# Patient Record
Sex: MALE | Race: WHITE | NOT HISPANIC OR LATINO | URBAN - METROPOLITAN AREA
[De-identification: names, ages, dates, MRNs, and addresses within clinical notes are randomized per-mention and may not be internally consistent; named-entity substitution may affect disease eponyms.]

---
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
## 2021-01-05 PROCEDURE — 99214 OFFICE O/P EST MOD 30 MIN: CPT | Mod: 95

## 2021-02-22 PROCEDURE — 99442: CPT

## 2021-03-22 PROCEDURE — 99442: CPT

## 2021-05-10 PROCEDURE — 99214 OFFICE O/P EST MOD 30 MIN: CPT | Mod: 95

## 2021-06-07 PROCEDURE — 99442: CPT

## 2021-07-12 PROCEDURE — 99442: CPT

## 2021-08-02 PROCEDURE — 99214 OFFICE O/P EST MOD 30 MIN: CPT | Mod: 95

## 2021-09-13 PROCEDURE — 99214 OFFICE O/P EST MOD 30 MIN: CPT | Mod: 95

## 2021-10-18 PROCEDURE — 99442: CPT

## 2021-11-01 PROCEDURE — 99442: CPT

## 2021-11-29 PROCEDURE — 99214 OFFICE O/P EST MOD 30 MIN: CPT | Mod: 95

## 2021-11-30 NOTE — ED PROVIDER NOTE - PRINCIPAL DIAGNOSIS
Closed head injury, initial encounter Social: Denies any alcohol, tobacco, illicit substance use  Psych: denies any anxiety or depression

## 2021-12-27 PROCEDURE — 99442: CPT

## 2022-01-24 PROCEDURE — 99214 OFFICE O/P EST MOD 30 MIN: CPT | Mod: 95

## 2022-02-26 NOTE — ED BEHAVIORAL HEALTH ASSESSMENT NOTE - LEGAL HISTORY
Chief complaint:   Chief Complaint   Patient presents with   • URI     rm 5   • Office Visit       Vitals:  Visit Vitals  Pulse 130   Temp 100.1 °F (37.8 °C) (Tympanic)   Resp 32   Ht 3' 0.05\" (0.916 m)   Wt 15 kg (33 lb 1.1 oz)   SpO2 97%   BMI 17.89 kg/m²       HISTORY OF PRESENT ILLNESS     HPI    4-year-old girl brought in by dad along with her brother with complain of fever, cough and congestion x 0 days.    Dad states patient has runny nose, nasal congestion cough.  She has had intermittent fever for last 2-3 days temp here is 100.1 F.  Dad states appetite is slightly less than usual.  Patient is drinking plenty of fluids including water, juice and milk.  She seems little tired but is getting better today.  Dad denies any difficulty breathing, wheezing, vomiting, diarrhea, pain, ear pain drainage and rash.  Patient does not go to .  She stays home with her mom. Dad states he has no concern for COVID-19 infection.  Mom had COVID-19 infection 2022.    Other significant problems:  Patient Active Problem List    Diagnosis Date Noted   • Single liveborn, born in hospital, delivered by  delivery 2017     Priority: Low   • Oscar positive 2018     Priority: Low       PAST MEDICAL, FAMILY AND SOCIAL HISTORY     Medications:  No current outpatient medications on file.     No current facility-administered medications for this visit.       Allergies:  ALLERGIES:  No Known Allergies    Past Medical  History/Surgeries:  No past medical history on file.    No past surgical history on file.    Family History:  No family history on file.    Social History:  Social History     Tobacco Use   • Smoking status: Not on file   • Smokeless tobacco: Not on file   Substance Use Topics   • Alcohol use: Not on file       REVIEW OF SYSTEMS     Review of Systems   Constitutional: Positive for appetite change and fever. Negative for irritability.   HENT: Positive for congestion and rhinorrhea.     Respiratory: Positive for cough. Negative for wheezing.    Gastrointestinal: Negative for diarrhea and vomiting.   Skin: Negative for rash.       PHYSICAL EXAM     Physical Exam  Vitals and nursing note reviewed.   Constitutional:       General: She is active.      Appearance: She is well-developed. She is not toxic-appearing.   HENT:      Right Ear: Tympanic membrane, ear canal and external ear normal. Tympanic membrane is not erythematous or bulging.      Left Ear: Tympanic membrane and external ear normal. Tympanic membrane is not erythematous or bulging.      Nose: Congestion present. No rhinorrhea.      Mouth/Throat:      Pharynx: Oropharynx is clear.      Comments: No  erythema of posterior pharyngeal  wall noted. No tonsillar enlargement, exudate or abscess noted.  Uvula midline.     Neck: Neck supple.   Eyes:      Conjunctiva/sclera: Conjunctivae normal.      Pupils: Pupils are equal, round, and reactive to light.   Cardiovascular:      Rate and Rhythm: Normal rate and regular rhythm.      Pulses: Normal pulses.      Heart sounds: Normal heart sounds, S1 normal and S2 normal.   Pulmonary:      Effort: Pulmonary effort is normal.      Breath sounds: Normal breath sounds.      Comments: Lungs - No use of accessory muscles of respiration. Good and equal air entry in both lung fields. No wheezing, rales, stridor or rhonchi noted. Pulse ox normal.    Skin:     Capillary Refill: Capillary refill takes less than 2 seconds.   Neurological:      Mental Status: She is alert.         ASSESSMENT/PLAN     Mackenzie was seen today for uri and office visit.    Diagnoses and all orders for this visit:    Viral URI with cough    -Discussed option of getting COVID-19 test today but dad declined.  Patient has been sick for 4+ days already and the fever is coming down so no point of doing flu test at this point.  -Explained to dad  that it is a viral infection and can take up to 7-14  days to resolve.  -Make sure your child gets  enough fluids.   -Use a humidifier in your child's bedroom.  -If your child is uncomfortable because of fever, you can give over-the-counter children's Tylenol every 4 hours or Children's Motrin every 6 hours as needed.  -Feed the child warm, clear liquids to soothe the throat and to help loosen mucus.  -Prop your child's head up on pillows or with the help of a car seat.  -Sleep in the same room as your child, so that you know right away if he or she starts having trouble breathing.  -Do not allow anyone to smoke near your child.  -Go to the hospital if your child is lethargic, vomiting, having severe diarrhea or is unable to eat or drink, has a high fever, severe difficulty breathing, is wheezing, has chest retractions; otherwise follow-up with primary physician next week if no improvement of symptoms.  -Written instructions given.  -Dad understands and agrees with the plan.     none

## 2022-02-28 PROCEDURE — 99442: CPT

## 2022-03-15 PROCEDURE — 99442: CPT | Mod: 95

## 2022-03-29 PROCEDURE — 99442: CPT | Mod: 95

## 2022-04-12 PROCEDURE — 99442: CPT

## 2022-05-03 PROCEDURE — 99442: CPT

## 2022-05-31 PROCEDURE — 99442: CPT

## 2022-06-28 PROCEDURE — 99442: CPT

## 2022-07-26 PROCEDURE — 99442: CPT

## 2022-12-06 PROCEDURE — 99214 OFFICE O/P EST MOD 30 MIN: CPT | Mod: 95

## 2022-12-22 PROCEDURE — 99214 OFFICE O/P EST MOD 30 MIN: CPT | Mod: 95

## 2023-01-19 PROCEDURE — 99214 OFFICE O/P EST MOD 30 MIN: CPT | Mod: 95

## 2023-02-17 PROCEDURE — 99442: CPT

## 2023-03-24 PROCEDURE — 99442: CPT | Mod: 95

## 2023-04-05 PROCEDURE — 99214 OFFICE O/P EST MOD 30 MIN: CPT | Mod: 95

## 2023-04-21 PROCEDURE — 99214 OFFICE O/P EST MOD 30 MIN: CPT | Mod: 95

## 2023-05-19 PROCEDURE — 99214 OFFICE O/P EST MOD 30 MIN: CPT | Mod: 95

## 2023-06-23 PROCEDURE — 99214 OFFICE O/P EST MOD 30 MIN: CPT | Mod: 95

## 2023-07-28 PROCEDURE — 99214 OFFICE O/P EST MOD 30 MIN: CPT | Mod: 95

## 2023-08-21 ENCOUNTER — OFFICE (OUTPATIENT)
Dept: URBAN - METROPOLITAN AREA CLINIC 35 | Facility: CLINIC | Age: 23
Setting detail: OPHTHALMOLOGY
End: 2023-08-21
Payer: COMMERCIAL

## 2023-08-21 DIAGNOSIS — H01.004: ICD-10-CM

## 2023-08-21 DIAGNOSIS — H10.45: ICD-10-CM

## 2023-08-21 DIAGNOSIS — H01.001: ICD-10-CM

## 2023-08-21 DIAGNOSIS — H52.7: ICD-10-CM

## 2023-08-21 DIAGNOSIS — H52.13: ICD-10-CM

## 2023-08-21 PROCEDURE — 92015 DETERMINE REFRACTIVE STATE: CPT | Performed by: OPHTHALMOLOGY

## 2023-08-21 PROCEDURE — 99213 OFFICE O/P EST LOW 20 MIN: CPT | Performed by: OPHTHALMOLOGY

## 2023-08-21 ASSESSMENT — REFRACTION_MANIFEST
OD_VA1: 20/20
OD_AXIS: 090
OD_AXIS: 090
OS_AXIS: 080
OS_AXIS: 090
OS_CYLINDER: +1.00
OD_CYLINDER: +1.00
OS_VA1: 20/20
OD_VA1: 20/20-
OS_SPHERE: -1.75
OS_SPHERE: -2.00
OS_VA1: 20/20
OD_SPHERE: -1.50
OS_CYLINDER: +1.25
OD_CYLINDER: +1.00
OD_SPHERE: -1.50

## 2023-08-21 ASSESSMENT — SPHEQUIV_DERIVED
OS_SPHEQUIV: -1.375
OS_SPHEQUIV: -1.25
OS_SPHEQUIV: -1.25
OD_SPHEQUIV: -1
OD_SPHEQUIV: -0.875
OD_SPHEQUIV: -1

## 2023-08-21 ASSESSMENT — AXIALLENGTH_DERIVED
OS_AL: 23.7122
OS_AL: 23.7615
OS_AL: 23.7122
OD_AL: 23.6603
OD_AL: 23.6114
OD_AL: 23.6603

## 2023-08-21 ASSESSMENT — KERATOMETRY
OS_K1POWER_DIOPTERS: 43.50
OS_AXISANGLE_DEGREES: 086
OD_K1POWER_DIOPTERS: 43.50
OS_K2POWER_DIOPTERS: 45.50
OD_AXISANGLE_DEGREES: 096
OD_K2POWER_DIOPTERS: 45.25

## 2023-08-21 ASSESSMENT — REFRACTION_CURRENTRX
OS_AXIS: 90
OD_SPHERE: -1.50
OD_AXIS: 85
OS_OVR_VA: 20/
OD_CYLINDER: +1.00
OS_SPHERE: -1.75
OD_OVR_VA: 20/
OS_CYLINDER: +1.00

## 2023-08-21 ASSESSMENT — REFRACTION_AUTOREFRACTION
OD_SPHERE: -1.50
OS_AXIS: 080
OS_CYLINDER: +1.50
OD_CYLINDER: +1.25
OS_SPHERE: -2.00
OD_AXIS: 090

## 2023-08-21 ASSESSMENT — LID EXAM ASSESSMENTS
OD_BLEPHARITIS: T
OS_BLEPHARITIS: T

## 2023-08-21 ASSESSMENT — TONOMETRY
OS_IOP_MMHG: 14
OD_IOP_MMHG: 14

## 2023-08-21 ASSESSMENT — VISUAL ACUITY
OD_BCVA: 20/20-2
OS_BCVA: 20/20

## 2023-09-22 PROCEDURE — 99214 OFFICE O/P EST MOD 30 MIN: CPT | Mod: 95

## 2023-11-03 PROCEDURE — 99214 OFFICE O/P EST MOD 30 MIN: CPT | Mod: 95

## 2024-02-09 PROCEDURE — 90833 PSYTX W PT W E/M 30 MIN: CPT | Mod: 95

## 2024-02-09 PROCEDURE — 99214 OFFICE O/P EST MOD 30 MIN: CPT | Mod: 95

## 2024-05-15 ENCOUNTER — APPOINTMENT (OUTPATIENT)
Dept: INTERNAL MEDICINE | Facility: CLINIC | Age: 24
End: 2024-05-15
Payer: COMMERCIAL

## 2024-05-15 VITALS
RESPIRATION RATE: 16 BRPM | DIASTOLIC BLOOD PRESSURE: 78 MMHG | HEART RATE: 70 BPM | HEIGHT: 74 IN | SYSTOLIC BLOOD PRESSURE: 120 MMHG | TEMPERATURE: 97.5 F | OXYGEN SATURATION: 96 % | BODY MASS INDEX: 28.23 KG/M2 | WEIGHT: 220 LBS

## 2024-05-15 DIAGNOSIS — F32.A ANXIETY DISORDER, UNSPECIFIED: ICD-10-CM

## 2024-05-15 DIAGNOSIS — F41.9 ANXIETY DISORDER, UNSPECIFIED: ICD-10-CM

## 2024-05-15 DIAGNOSIS — Z00.00 ENCOUNTER FOR GENERAL ADULT MEDICAL EXAMINATION W/OUT ABNORMAL FINDINGS: ICD-10-CM

## 2024-05-15 DIAGNOSIS — F90.2 ATTENTION-DEFICIT HYPERACTIVITY DISORDER, COMBINED TYPE: ICD-10-CM

## 2024-05-15 PROCEDURE — 99204 OFFICE O/P NEW MOD 45 MIN: CPT

## 2024-05-15 RX ORDER — RISPERIDONE 0.5 MG/1
0.5 TABLET, FILM COATED ORAL
Qty: 90 | Refills: 0 | Status: DISCONTINUED | COMMUNITY
Start: 2017-04-03 | End: 2024-05-15

## 2024-05-15 RX ORDER — RISPERIDONE 1 MG/1
1 TABLET, FILM COATED ORAL
Qty: 90 | Refills: 0 | Status: DISCONTINUED | COMMUNITY
Start: 2017-09-11 | End: 2024-05-15

## 2024-05-15 RX ORDER — LAMOTRIGINE 100 MG/1
100 TABLET ORAL
Qty: 180 | Refills: 0 | Status: DISCONTINUED | COMMUNITY
Start: 2017-08-11 | End: 2024-05-15

## 2024-05-15 NOTE — PHYSICAL EXAM
[No Acute Distress] : no acute distress [Well Nourished] : well nourished [Well Developed] : well developed [Well-Appearing] : well-appearing [Normal Sclera/Conjunctiva] : normal sclera/conjunctiva [PERRL] : pupils equal round and reactive to light [EOMI] : extraocular movements intact [Normal Outer Ear/Nose] : the outer ears and nose were normal in appearance [Normal Oropharynx] : the oropharynx was normal [No JVD] : no jugular venous distention [No Lymphadenopathy] : no lymphadenopathy [Supple] : supple [Thyroid Normal, No Nodules] : the thyroid was normal and there were no nodules present [No Respiratory Distress] : no respiratory distress  [No Accessory Muscle Use] : no accessory muscle use [Clear to Auscultation] : lungs were clear to auscultation bilaterally [Normal Rate] : normal rate  [Regular Rhythm] : with a regular rhythm [Normal S1, S2] : normal S1 and S2 [No Murmur] : no murmur heard [No Carotid Bruits] : no carotid bruits [No Abdominal Bruit] : a ~M bruit was not heard ~T in the abdomen [No Varicosities] : no varicosities [Pedal Pulses Present] : the pedal pulses are present [No Edema] : there was no peripheral edema [No Palpable Aorta] : no palpable aorta [No Extremity Clubbing/Cyanosis] : no extremity clubbing/cyanosis [Soft] : abdomen soft [Non Tender] : non-tender [Non-distended] : non-distended [No Masses] : no abdominal mass palpated [No HSM] : no HSM [Normal Bowel Sounds] : normal bowel sounds [Normal Posterior Cervical Nodes] : no posterior cervical lymphadenopathy [Normal Anterior Cervical Nodes] : no anterior cervical lymphadenopathy [No CVA Tenderness] : no CVA  tenderness [No Spinal Tenderness] : no spinal tenderness [Kyphosis] : kyphosis [No Joint Swelling] : no joint swelling [Grossly Normal Strength/Tone] : grossly normal strength/tone [No Rash] : no rash [Coordination Grossly Intact] : coordination grossly intact [No Focal Deficits] : no focal deficits [Normal Gait] : normal gait [Deep Tendon Reflexes (DTR)] : deep tendon reflexes were 2+ and symmetric [Normal Affect] : the affect was normal [Normal Insight/Judgement] : insight and judgment were intact [de-identified] : PHQ 14 WAYNE 13 ASRS v 1.1 inattentive 25 points hyperactive 27 points, raw 7 out of 9 inattentive, 4 out of 5 hyperactive motor, 3 out of 4 hyperactive verbal [de-identified] : striae upper back and lower abdomen

## 2024-05-15 NOTE — HEALTH RISK ASSESSMENT
[Excellent] : ~his/her~ current health as excellent [Good] : ~his/her~  mood as  good [FreeTextEntry1] : mood [Monthly or less (1 pt)] : Monthly or less (1 point) [3 or 4 (1 pt)] : 3 or 4  (1 point) [Never (0 pts)] : Never (0 points) [Yes] : In the past 12 months have you used drugs other than those required for medical reasons? Yes [No falls in past year] : Patient reported no falls in the past year [2] : 1) Little interest or pleasure doing things for more than half of the days (2) [1] : 2) Feeling down, depressed, or hopeless for several days (1) [PHQ-9 Positive] : PHQ-9 Positive [Audit-CScore] : 2 [de-identified] : Vapes THC 7-12 hits nightly [de-identified] : rare gym [de-identified] : portion control [[id number]] : 3 [[facility]] : 9 [Patient declined mammogram] : Patient declined mammogram [Patient declined PAP Smear] : Patient declined PAP Smear [Patient declined bone density test] : Patient declined bone density test [Patient declined colonoscopy] : Patient declined colonoscopy [HIV test declined] : HIV test declined

## 2024-05-15 NOTE — ASSESSMENT
[FreeTextEntry1] : *Anxiety and depression.  *Mixed ADHD, untreated. *Regular THC use. Denies bipolar history.  He has no contact with his father side of the family, the patient describes him as "not a nice man."  Chronic THC use may be worsening his anxiety, and life task successes may be promoting anxiety due to untreated severe mixed ADHD.  Prior Lexapro 20 mg daily, Lamictal, Risperdal, Strattera.  Was off of most of these medications for some time apparently, Strattera discontinued due to patient choice to not take as prescribed, and recently just discharged from his child and adolescent psychiatrist's practice perhaps due to noncompliance and or age 24.  That said no provision for follow-up with an adult psychiatrist seemingly in place, I messaged Dr. Villarreal if she had anyone in mind should patient be willing to engage meaningfully. Contracts for safety Check urine drug screen, TSH CBC CMP.  *Routine adult health maintenance Vaccinations: Tdap Gardasil status unknown, will obtain pediatrician records; flu did not COVID booster eligible Screening for dyslipidemia diabetes thyroid disease anemia liver and kidney disease elevated LP(a) vitamin D deficiency organized.  Hope to see him transition away from THC vaping to some regular physical activity. It was a pleasure to visit with Mr. Hunt today.  Answered all questions best I could. Disposition call results follow-up 1 month given his decompensated mood disorder. Time:50 min

## 2024-05-15 NOTE — HISTORY OF PRESENT ILLNESS
[FreeTextEntry1] : CPE Est care [de-identified] : Most pleasant 24-year-old white male with history of anxiety and depression currenly seeing his therapist weekly, previously on Lexapro lamotrigine and risperidone years ago, mixed ADHD self d/c'd Strattera and dismissed from his psychiatrist's practice a few months ago, sports related injuries including concussion in 2018, right knee meniscus tear 2021 requiring arthroscopic repair, right radio ulnar fracture ~2005 status post casting but no ORIF, who comes in as a new patient for CPE, worsening mood, and age-appropriate health screening.  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  He is living at home with his mother and 2 sisters, and is graduating with a degree in psychology from [employer] this month.   He believes finances, graduation, family stressors are playing a role. His daily THC 7-12 hits have not escalated, I am not sure how many credits he carried this semester. Denies STI or IVDA history.  He is not interested but does not reject the idea of starting/restarting medications.

## 2024-06-19 ENCOUNTER — APPOINTMENT (OUTPATIENT)
Dept: INTERNAL MEDICINE | Facility: CLINIC | Age: 24
End: 2024-06-19

## 2024-07-01 ENCOUNTER — APPOINTMENT (OUTPATIENT)
Dept: PSYCHIATRY | Facility: CLINIC | Age: 24
End: 2024-07-01
Payer: COMMERCIAL

## 2024-07-01 PROCEDURE — 99205 OFFICE O/P NEW HI 60 MIN: CPT

## 2024-07-30 ENCOUNTER — APPOINTMENT (OUTPATIENT)
Dept: PSYCHIATRY | Facility: CLINIC | Age: 24
End: 2024-07-30
Payer: COMMERCIAL

## 2024-07-30 PROCEDURE — 99214 OFFICE O/P EST MOD 30 MIN: CPT

## 2024-07-30 RX ORDER — ESCITALOPRAM OXALATE 5 MG/1
5 TABLET ORAL
Qty: 30 | Refills: 0 | Status: ACTIVE | COMMUNITY
Start: 2024-07-30 | End: 1900-01-01

## 2024-07-30 NOTE — PHYSICAL EXAM
[None] : none [Cooperative] : cooperative [Anxious] : anxious [Full] : full [Clear] : clear [Linear/Goal Directed] : linear/goal directed [WNL] : within normal limits [Average] : average [de-identified] : fair [de-identified] : fair

## 2024-07-30 NOTE — PLAN
[FreeTextEntry4] : Assessment: Patient is a 25 yo male with h/o anxiety and ADHD, seen today for medication management. Patient is compliant with the medications, tolerating it well without any side effects. I-STOP was checked without any problems.  PLAN: DECREASE MARIJUANA USE Increase Lexapro 10 to 15 mg PO QD for anxiety Continue Strattera 40 mg PO QD for ADHD - Discussed risks and benefits of medications including side effects of GI and sexual with SSRI. Alternative strategies including no intervention discussed with patient. Patient consents to current medications as prescribed. - Discussed with patient regarding importance of abstinence and sobriety from alcohol and drugs. Educated about relationship between worsening mood/anxiety symptoms and drug use and improvement of symptoms with abstinence.  - Discussed about unpredictable effects including cardiorespiratory collapse from the combination of illicit drugs and prescribed medications. Patient verbalized understanding. - Patient understands to contact clinic prn with concerns and agrees to call 911 or go to nearest ER if symptoms worsen. - Next appointment was made by the patient in 6-8 weeks Patient was not in any distress.

## 2024-07-30 NOTE — FAMILY HISTORY
[FreeTextEntry1] : Father: alcohol and marijuana Paternal brother: cocaine Parents side both depressions.  Mother's side substance use
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
## 2024-08-02 ENCOUNTER — APPOINTMENT (OUTPATIENT)
Dept: PSYCHIATRY | Facility: CLINIC | Age: 24
End: 2024-08-02

## 2024-09-12 ENCOUNTER — INPATIENT (INPATIENT)
Facility: HOSPITAL | Age: 24
LOS: 7 days | Discharge: HOME | End: 2024-09-20
Attending: STUDENT IN AN ORGANIZED HEALTH CARE EDUCATION/TRAINING PROGRAM | Admitting: STUDENT IN AN ORGANIZED HEALTH CARE EDUCATION/TRAINING PROGRAM
Payer: COMMERCIAL

## 2024-09-12 VITALS
RESPIRATION RATE: 16 BRPM | HEART RATE: 84 BPM | WEIGHT: 225.09 LBS | TEMPERATURE: 97 F | DIASTOLIC BLOOD PRESSURE: 89 MMHG | SYSTOLIC BLOOD PRESSURE: 137 MMHG | HEIGHT: 76 IN | OXYGEN SATURATION: 98 %

## 2024-09-12 DIAGNOSIS — F32.1 MAJOR DEPRESSIVE DISORDER, SINGLE EPISODE, MODERATE: ICD-10-CM

## 2024-09-12 LAB
ALBUMIN SERPL ELPH-MCNC: 4.7 G/DL — SIGNIFICANT CHANGE UP (ref 3.3–5)
ALP SERPL-CCNC: 70 U/L — SIGNIFICANT CHANGE UP (ref 40–120)
ALT FLD-CCNC: 22 U/L — SIGNIFICANT CHANGE UP (ref 4–41)
AMPHET UR-MCNC: NEGATIVE — SIGNIFICANT CHANGE UP
ANION GAP SERPL CALC-SCNC: 16 MMOL/L — HIGH (ref 7–14)
APAP SERPL-MCNC: <10 UG/ML — LOW (ref 15–25)
APPEARANCE UR: CLEAR — SIGNIFICANT CHANGE UP
AST SERPL-CCNC: 23 U/L — SIGNIFICANT CHANGE UP (ref 4–40)
BARBITURATES UR SCN-MCNC: NEGATIVE — SIGNIFICANT CHANGE UP
BASOPHILS # BLD AUTO: 0.05 K/UL — SIGNIFICANT CHANGE UP (ref 0–0.2)
BASOPHILS NFR BLD AUTO: 0.7 % — SIGNIFICANT CHANGE UP (ref 0–2)
BENZODIAZ UR-MCNC: NEGATIVE — SIGNIFICANT CHANGE UP
BILIRUB SERPL-MCNC: 0.7 MG/DL — SIGNIFICANT CHANGE UP (ref 0.2–1.2)
BILIRUB UR-MCNC: NEGATIVE — SIGNIFICANT CHANGE UP
BUN SERPL-MCNC: 15 MG/DL — SIGNIFICANT CHANGE UP (ref 7–23)
CALCIUM SERPL-MCNC: 9.6 MG/DL — SIGNIFICANT CHANGE UP (ref 8.4–10.5)
CHLORIDE SERPL-SCNC: 103 MMOL/L — SIGNIFICANT CHANGE UP (ref 98–107)
CO2 SERPL-SCNC: 21 MMOL/L — LOW (ref 22–31)
COCAINE METAB.OTHER UR-MCNC: NEGATIVE — SIGNIFICANT CHANGE UP
COLOR SPEC: YELLOW — SIGNIFICANT CHANGE UP
CREAT SERPL-MCNC: 1.17 MG/DL — SIGNIFICANT CHANGE UP (ref 0.5–1.3)
CREATININE URINE RESULT, DAU: 396 MG/DL — SIGNIFICANT CHANGE UP
DIFF PNL FLD: NEGATIVE — SIGNIFICANT CHANGE UP
EGFR: 90 ML/MIN/1.73M2 — SIGNIFICANT CHANGE UP
EOSINOPHIL # BLD AUTO: 0.11 K/UL — SIGNIFICANT CHANGE UP (ref 0–0.5)
EOSINOPHIL NFR BLD AUTO: 1.5 % — SIGNIFICANT CHANGE UP (ref 0–6)
ETHANOL SERPL-MCNC: <10 MG/DL — SIGNIFICANT CHANGE UP
FENTANYL UR QL SCN: NEGATIVE — SIGNIFICANT CHANGE UP
GLUCOSE SERPL-MCNC: 109 MG/DL — HIGH (ref 70–99)
GLUCOSE UR QL: NEGATIVE MG/DL — SIGNIFICANT CHANGE UP
HCT VFR BLD CALC: 46 % — SIGNIFICANT CHANGE UP (ref 39–50)
HGB BLD-MCNC: 15.7 G/DL — SIGNIFICANT CHANGE UP (ref 13–17)
IANC: 4.68 K/UL — SIGNIFICANT CHANGE UP (ref 1.8–7.4)
IMM GRANULOCYTES NFR BLD AUTO: 0.3 % — SIGNIFICANT CHANGE UP (ref 0–0.9)
KETONES UR-MCNC: NEGATIVE MG/DL — SIGNIFICANT CHANGE UP
LEUKOCYTE ESTERASE UR-ACNC: NEGATIVE — SIGNIFICANT CHANGE UP
LYMPHOCYTES # BLD AUTO: 1.95 K/UL — SIGNIFICANT CHANGE UP (ref 1–3.3)
LYMPHOCYTES # BLD AUTO: 25.8 % — SIGNIFICANT CHANGE UP (ref 13–44)
MCHC RBC-ENTMCNC: 29.8 PG — SIGNIFICANT CHANGE UP (ref 27–34)
MCHC RBC-ENTMCNC: 34.1 GM/DL — SIGNIFICANT CHANGE UP (ref 32–36)
MCV RBC AUTO: 87.5 FL — SIGNIFICANT CHANGE UP (ref 80–100)
METHADONE UR-MCNC: NEGATIVE — SIGNIFICANT CHANGE UP
MONOCYTES # BLD AUTO: 0.76 K/UL — SIGNIFICANT CHANGE UP (ref 0–0.9)
MONOCYTES NFR BLD AUTO: 10 % — SIGNIFICANT CHANGE UP (ref 2–14)
NEUTROPHILS # BLD AUTO: 4.68 K/UL — SIGNIFICANT CHANGE UP (ref 1.8–7.4)
NEUTROPHILS NFR BLD AUTO: 61.7 % — SIGNIFICANT CHANGE UP (ref 43–77)
NITRITE UR-MCNC: NEGATIVE — SIGNIFICANT CHANGE UP
NRBC # BLD: 0 /100 WBCS — SIGNIFICANT CHANGE UP (ref 0–0)
NRBC # FLD: 0 K/UL — SIGNIFICANT CHANGE UP (ref 0–0)
OPIATES UR-MCNC: NEGATIVE — SIGNIFICANT CHANGE UP
OXYCODONE UR-MCNC: NEGATIVE — SIGNIFICANT CHANGE UP
PCP SPEC-MCNC: SIGNIFICANT CHANGE UP
PCP UR-MCNC: NEGATIVE — SIGNIFICANT CHANGE UP
PH UR: 5.5 — SIGNIFICANT CHANGE UP (ref 5–8)
PLATELET # BLD AUTO: 274 K/UL — SIGNIFICANT CHANGE UP (ref 150–400)
POTASSIUM SERPL-MCNC: 3.8 MMOL/L — SIGNIFICANT CHANGE UP (ref 3.5–5.3)
POTASSIUM SERPL-SCNC: 3.8 MMOL/L — SIGNIFICANT CHANGE UP (ref 3.5–5.3)
PROT SERPL-MCNC: 7.8 G/DL — SIGNIFICANT CHANGE UP (ref 6–8.3)
PROT UR-MCNC: SIGNIFICANT CHANGE UP MG/DL
RBC # BLD: 5.26 M/UL — SIGNIFICANT CHANGE UP (ref 4.2–5.8)
RBC # FLD: 12.9 % — SIGNIFICANT CHANGE UP (ref 10.3–14.5)
SALICYLATES SERPL-MCNC: <0.3 MG/DL — LOW (ref 15–30)
SARS-COV-2 RNA SPEC QL NAA+PROBE: SIGNIFICANT CHANGE UP
SODIUM SERPL-SCNC: 140 MMOL/L — SIGNIFICANT CHANGE UP (ref 135–145)
SP GR SPEC: 1.03 — HIGH (ref 1–1.03)
THC UR QL: POSITIVE
TOXICOLOGY SCREEN, DRUGS OF ABUSE, SERUM RESULT: SIGNIFICANT CHANGE UP
TSH SERPL-MCNC: 2.54 UIU/ML — SIGNIFICANT CHANGE UP (ref 0.27–4.2)
UROBILINOGEN FLD QL: 0.2 MG/DL — SIGNIFICANT CHANGE UP (ref 0.2–1)
WBC # BLD: 7.57 K/UL — SIGNIFICANT CHANGE UP (ref 3.8–10.5)
WBC # FLD AUTO: 7.57 K/UL — SIGNIFICANT CHANGE UP (ref 3.8–10.5)

## 2024-09-12 PROCEDURE — 99285 EMERGENCY DEPT VISIT HI MDM: CPT

## 2024-09-12 PROCEDURE — 73120 X-RAY EXAM OF HAND: CPT | Mod: 26,RT

## 2024-09-12 RX ORDER — LORAZEPAM 4 MG/ML
2 INJECTION INTRAMUSCULAR; INTRAVENOUS ONCE
Refills: 0 | Status: DISCONTINUED | OUTPATIENT
Start: 2024-09-13 | End: 2024-09-18

## 2024-09-12 RX ORDER — ESCITALOPRAM OXALATE 10 MG/1
20 TABLET ORAL DAILY
Refills: 0 | Status: DISCONTINUED | OUTPATIENT
Start: 2024-09-13 | End: 2024-09-13

## 2024-09-12 RX ORDER — IBUPROFEN 600 MG
600 TABLET ORAL ONCE
Refills: 0 | Status: COMPLETED | OUTPATIENT
Start: 2024-09-12 | End: 2024-09-12

## 2024-09-12 RX ORDER — HYDROXYZINE HCL 25 MG
50 TABLET ORAL EVERY 6 HOURS
Refills: 0 | Status: DISCONTINUED | OUTPATIENT
Start: 2024-09-13 | End: 2024-09-20

## 2024-09-12 RX ORDER — LORAZEPAM 4 MG/ML
2 INJECTION INTRAMUSCULAR; INTRAVENOUS EVERY 6 HOURS
Refills: 0 | Status: DISCONTINUED | OUTPATIENT
Start: 2024-09-13 | End: 2024-09-18

## 2024-09-12 NOTE — ED PROVIDER NOTE - NSFOLLOWUPINSTRUCTIONS_ED_ALL_ED_FT
Follow up with your PMD within 48-72 hrs. Show copies of your reports given to you.   Worsening, continued or new concerning symptoms return to the emergency department.    You have been given information necessary to follow up with the  [facility] ([facility]) Crisis center & other outpatient  psychiatric clinics within your community    • [facility] walk in Crisis centre  [address] [facility] [place] [address]  [phone] [email or web address]  Hours of operation:  Day	                                        Hours  Sunday                                  Closed  Monday                                9am - 3pm  Tuesday                                9am - 3pm  Wednesday                          9am - 3pm  Thursday                               9am - 3pm  Friday                                    9am - 3pm  Saturday                                Closed

## 2024-09-12 NOTE — ED ADULT NURSE NOTE - OBJECTIVE STATEMENT
Received pt to , A+Ox4, ambulatory. C/O SI, no plan but states he has researched ways to hurt self. pt states relation with father and work environment cause stress and anxiety. pt has need to be "perfect" punches self in the head to disconnect form feelings. pt noted to have welt on forehead from self injurious behavior. endorsing worsening depressive mood x 2 weeks. .pt denies ETOh, admits to marijuana addiction, pt denies auditory/visual hallucinations. Pt denies any chest pain, SOB, headache, dizziness, N+V, diarrhea, fever, chills. pt calm and cooperative. plan of care ongoing,

## 2024-09-12 NOTE — ED BEHAVIORAL HEALTH ASSESSMENT NOTE - DESCRIPTION
intermittently tearful. no prns were needed.   Vital Signs Last 24 Hrs  T(C): 36.3 (12 Sep 2024 18:35), Max: 36.3 (12 Sep 2024 18:35)  T(F): 97.4 (12 Sep 2024 18:35), Max: 97.4 (12 Sep 2024 18:35)  HR: 84 (12 Sep 2024 18:35) (84 - 84)  BP: 137/89 (12 Sep 2024 18:35) (137/89 - 137/89)  BP(mean): --  RR: 16 (12 Sep 2024 18:35) (16 - 16)  SpO2: 98% (12 Sep 2024 18:35) (98% - 98%)    Parameters below as of 12 Sep 2024 18:35  Patient On (Oxygen Delivery Method): room air recently graduated from [employer] with a psychology degree, works as a behavioral specialist questionable familial high cholesterol

## 2024-09-12 NOTE — ED BEHAVIORAL HEALTH ASSESSMENT NOTE - RISK ASSESSMENT
Patient is currently at elevated risk of unintentional harm to self and others given current depressive state. He has chronic risk factors including male gender, hx of SIB and SA, hx of mood d/o. His acute risk factors include depressed mood, si, worthlessness   . Currently patient requesting inpatient admission for psychiatric stabilization and medication initiation/titration. He is agreeable to voluntary admission.

## 2024-09-12 NOTE — ED PROVIDER NOTE - NSICDXPASTSURGICALHX_GEN_ALL_CORE_FT
PAST SURGICAL HISTORY:  ADHD (attention deficit hyperactivity disorder)     S/P tonsillectomy and adenoidectomy
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
## 2024-09-12 NOTE — ED PROVIDER NOTE - NSICDXPASTMEDICALHX_GEN_ALL_CORE_FT
PAST MEDICAL HISTORY:  ADHD (attention deficit hyperactivity disorder)     Depression     Psychiatric symptoms

## 2024-09-12 NOTE — ED BEHAVIORAL HEALTH ASSESSMENT NOTE - VIOLENCE PROTECTIVE FACTORS:
Implemented All Universal Safety Interventions:  [facility] to call system. Call bell, personal items and telephone within reach. Instruct patient to call for assistance. Room bathroom lighting operational. Non-slip footwear when patient is off stretcher. Physically safe environment: no spills, clutter or unnecessary equipment. Stretcher in lowest position, wheels locked, appropriate side rails in place.
Residential stability
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
## 2024-09-12 NOTE — ED BEHAVIORAL HEALTH ASSESSMENT NOTE - SUICIDALITY
Subjective   Patient ID: Katey is a 31 year old female who presents today for prenatal visit.  She is of 16w3d gestation.  OB History    Para Term  AB Living   2 1 1 0 0 1   SAB IAB Ectopic Molar Multiple Live Births   0 0 0 0 0 1        HR issue: none     positive fetal movement, No bleeding, No rupture of membranes, No uterine contractions    No visits with results within 1 Day(s) from this visit.   Latest known visit with results is:   First OB Visit on 2022   Component Date Value Ref Range Status   • ABO/RH(D) 2022 O Rh Negative   Final   • ANTIBODY SCREEN 2022 Negative   Final   • Rubella Antibody, IgG 2022 32.0  >9.9 Units/mL Final     <5.0 Units/mL = Negative for IgG antibodies (Non Immune)  5.0 to 9.9 Units/mL = Equivocal (Non Immune)   >9.9 Units/mL = Immune    Result does not represent an antibody titer.   • Chlamydia trachomatis by Nucleic A* 2022 Negative  Negative Final   • Neisseria gonorrhoeae by Nucleic A* 2022 Negative  Negative Final   • Disclaimer 2022    Final                    Value:This result contains rich text formatting which cannot be displayed here.   • Varicella Zoster Antibody, IgG 2022 Immune  Immune Final    ISR >1.09   • RPR 2022 Nonreactive  Nonreactive Final    See Directory of Services for interpretation of syphilis testing algorithm.   • Hepatitis B Surface Antigen 2022 Negative  Negative Final   • Hepatitis C Antibody 2022 Negative  Negative Final   • HIV Antigen/ Antibody Combo Screen 2022 Nonreactive  Nonreactive Final   • Urine, Bacterial Culture 2022 10,000 - 50,000 CFU/mL Mixed bacterial quyen with no predominating type   Final   • WBC 2022 12.5 (A) 4.2 - 11.0 K/mcL Final   • RBC 2022 4.18  4.00 - 5.20 mil/mcL Final   • HGB 2022 13.0  12.0 - 15.5 g/dL Final   • HCT 2022 38.5  36.0 - 46.5 % Final   • MCV 2022 92.1  78.0 - 100.0 fl Final   • MCH  06/28/2022 31.1  26.0 - 34.0 pg Final   • MCHC 06/28/2022 33.8  32.0 - 36.5 g/dL Final   • RDW-CV 06/28/2022 13.0  11.0 - 15.0 % Final   • RDW-SD 06/28/2022 43.5  39.0 - 50.0 fL Final   • PLT 06/28/2022 232  140 - 450 K/mcL Final   • NRBC 06/28/2022 0  <=0 /100 WBC Final   • Neutrophil, Percent 06/28/2022 82  % Final   • Lymphocytes, Percent 06/28/2022 12  % Final   • Mono, Percent 06/28/2022 5  % Final   • Eosinophils, Percent 06/28/2022 0  % Final   • Basophils, Percent 06/28/2022 0  % Final   • Immature Granulocytes 06/28/2022 1  % Final   • Absolute Neutrophils 06/28/2022 10.2 (A) 1.8 - 7.7 K/mcL Final   • Absolute Lymphocytes 06/28/2022 1.5  1.0 - 4.8 K/mcL Final   • Absolute Monocytes 06/28/2022 0.6  0.3 - 0.9 K/mcL Final   • Absolute Eosinophils  06/28/2022 0.0  0.0 - 0.5 K/mcL Final   • Absolute Basophils 06/28/2022 0.0  0.0 - 0.3 K/mcL Final   • Absolute Immmature Granulocytes 06/28/2022 0.1  0.0 - 0.2 K/mcL Final   • Case Report 06/28/2022    Final                    Value:Gynecological Cytology                            Case: [id number]                                 Authorizing Provider:  Ladonna Mccarthy CNM       Collected:           06/28/2022 1229              Ordering Location:     [facility]     Received:            06/29/2022 [address] [place] [address]                                                     First Screen:          Thong Patiño                                                                Specimen:    ThinPrep Pap Test, Cervicovaginal                                                         • Interpretation 06/28/2022 Negative for intraepithelial lesion or malignancy.    Final   • Specimen Adequacy 06/28/2022 Satisfactory for evaluation, endocervical/transformation zone component absent.    Final   • Clinical Information 06/28/2022    Final                    Value:This result contains rich text formatting which cannot be displayed  here.   • Pap Educational Note 2022    Final                    Value:This result contains rich text formatting which cannot be displayed here.   • Extra Tube 2022    Final                    Value:This result contains rich text formatting which cannot be displayed here.       ASSESSMENT:  Pregnancy at 16w3d weeks gestation.  Problem List Items Addressed This Visit    None     Visit Diagnoses     Encounter for supervision of other normal pregnancy in second trimester    -  Primary    Relevant Orders    ALPHA FETOPROTEIN ONTD SCREEN    POCT US OB >14 WKS TRANSABD W/ TRANSVAG IN OFFICE           Procedures     PLAN:  Return in about 4 weeks (around 2022) for EDGAR with anatomy US with Fady.      -FM and labor precautions reviewed  -Reviewed S/S of  Labor  -Plan for 4 weeks with anatomy scan next visit  -COVID-19 Q&A  -All questions answered, Pt Verbalizes understanding.    Go to hospital for contractions that are 3-5 minutes apart, LOF, vaginal bleeding, or decreased fetal movement.   No
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
## 2024-09-12 NOTE — ED ADULT TRIAGE NOTE - CHIEF COMPLAINT QUOTE
Pt c/o feeling depressed and S/I for 3 week.  Pt states he has hit himself in the head today to hurt himself.  Pt denies H/I, no hallucinations. Hx: depression, right knee sx, tonsillectomy, high cholesterol, jaw fx, OCD, ADHD

## 2024-09-12 NOTE — ED BEHAVIORAL HEALTH ASSESSMENT NOTE - OTHER PAST PSYCHIATRIC HISTORY (INCLUDE DETAILS REGARDING ONSET, COURSE OF ILLNESS, INPATIENT/OUTPATIENT TREATMENT)
Psychiatric history of attention deficit hyperactivity disorder (on Straterra) OCD,  unspecified mood disorder on lexapro, with one prior suicide attempt (2014), a history of self-cutting, two prior inpatient psychiatric hospitalizations (10/2014 at [facility] and 11/2014 at [facility]), and 30 day treatment program at [facility], engaged in outpatient psychiatric treatment with Dr. Matthew, also with private psychologist/counseling Dr. Rocha  3 past psych hospitalization  1 past SA by hanging in 2016

## 2024-09-12 NOTE — ED PROVIDER NOTE - CARE PLAN
Principal Discharge DX:	Current moderate episode of major depressive disorder without prior episode   1
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
## 2024-09-12 NOTE — ED BEHAVIORAL HEALTH ASSESSMENT NOTE - PSYCHIATRIC ISSUES AND PLAN (INCLUDE STANDING AND PRN MEDICATION)
increase Lexapro to 20mg po daily. c/w Strattera  40mg, Atarax 50mg po q6hrs prn, ativan 2mg po/im q6hrs
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
## 2024-09-13 DIAGNOSIS — F42.9 OBSESSIVE-COMPULSIVE DISORDER, UNSPECIFIED: ICD-10-CM

## 2024-09-13 DIAGNOSIS — F90.9 ATTENTION-DEFICIT HYPERACTIVITY DISORDER, UNSPECIFIED TYPE: ICD-10-CM

## 2024-09-13 DIAGNOSIS — F60.3 BORDERLINE PERSONALITY DISORDER: ICD-10-CM

## 2024-09-13 PROCEDURE — 90853 GROUP PSYCHOTHERAPY: CPT

## 2024-09-13 PROCEDURE — 99223 1ST HOSP IP/OBS HIGH 75: CPT | Mod: GC

## 2024-09-13 RX ORDER — ESCITALOPRAM OXALATE 10 MG/1
20 TABLET ORAL AT BEDTIME
Refills: 0 | Status: DISCONTINUED | OUTPATIENT
Start: 2024-09-13 | End: 2024-09-20

## 2024-09-13 RX ORDER — LORAZEPAM 4 MG/ML
1 INJECTION INTRAMUSCULAR; INTRAVENOUS AT BEDTIME
Refills: 0 | Status: DISCONTINUED | OUTPATIENT
Start: 2024-09-13 | End: 2024-09-18

## 2024-09-13 RX ORDER — ATOMOXETINE 25 MG/1
40 CAPSULE ORAL DAILY
Refills: 0 | Status: DISCONTINUED | OUTPATIENT
Start: 2024-09-13 | End: 2024-09-13

## 2024-09-13 RX ADMIN — ESCITALOPRAM OXALATE 20 MILLIGRAM(S): 10 TABLET ORAL at 20:51

## 2024-09-13 RX ADMIN — Medication 600 MILLIGRAM(S): at 00:14

## 2024-09-13 RX ADMIN — Medication 50 MILLIGRAM(S): at 18:37
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
## 2024-09-13 NOTE — BH PATIENT PROFILE - FALL HARM RISK - UNIVERSAL INTERVENTIONS
Bed in lowest position, wheels locked, appropriate side rails in place/Call bell, personal items and telephone in reach/Instruct patient to call for assistance before getting out of bed or chair/Non-slip footwear when patient is out of bed/[place] to call system/Physically safe environment - no spills, clutter or unnecessary equipment/Purposeful Proactive Rounding/Room/bathroom lighting operational, light cord in reach

## 2024-09-13 NOTE — PSYCHIATRIC REHAB INITIAL EVALUATION - NSBHPRRECOMMEND_PSY_ALL_CORE
Writer met with patient to orient patient to unit 1S as well as the role/function of Activities Specialists and Inpatient Psychiatric Rehabilitation programming. Patient demonstrated full engagement with writer during initial session, presenting as appropriately dressed and well-groomed with a euthymic mood. Patient was able to identify an appropriate rehabilitation goal within the context of presenting symptoms. Patient rehabilitation goal is to express feelings and identify thoughts associated with suicidal ideation. Psychiatric Rehabilitation staff will meet with patient weekly to review progress towards goal.

## 2024-09-13 NOTE — BH INPATIENT PSYCHIATRY ASSESSMENT NOTE - NSICDXBHSECONDARYDX_PSY_ALL_CORE
Current moderate episode of major depressive disorder without prior episode   F32.1  OCD (obsessive compulsive disorder)   F42.9  ADHD   F90.9  Borderline personality disorder   F60.3
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
## 2024-09-13 NOTE — BH INPATIENT PSYCHIATRY ASSESSMENT NOTE - MSE UNSTRUCTURED FT
Appearance: good grooming, good hygiene, fair eye contact, overweight, tall, no deformities present  Attitude/Behavior: alert, cooperative, depressed, intermittently tearful, AAOx3  Psychomotor: tics, tapping on walls as he walks by them, facial twitching, normal muscle tone ans strength  Gait: normal gait and station  Quality of speech:   Mood: depressed, anxious  Affect type: congruent  Affect Range: constricted  Thought process: rumination, normal thought associations, over-inclusive  Thought Content: hopelessness, guilt  Perceptual: none  Insight: fair  Judgement: poor   Impulse Control: normal  Appearance: good grooming, good hygiene, fair eye contact, overweight, tall, no deformities present  Attitude/Behavior: alert, cooperative, depressed, intermittently tearful, AAOx3  Psychomotor: tics, tapping on walls as he walks by them, facial twitching, normal muscle tone and strength  Gait: normal gait and station  Quality of speech:   Mood: depressed, anxious  Affect type: congruent  Affect Range: constricted  Thought process: rumination, normal thought associations, over-inclusive  Thought Content: hopelessness, guilt  Perceptual: none  Insight: fair  Judgement: poor   Impulse Control: normal

## 2024-09-13 NOTE — BH INPATIENT PSYCHIATRY ASSESSMENT NOTE - NSBHCHARTREVIEWLAB_PSY_A_CORE FT
15.7   7.57  )-----------( 274      ( 12 Sep 2024 21:08 )             46.0   CBC Full  -  ( 12 Sep 2024 21:08 )  WBC Count : 7.57 K/uL  RBC Count : 5.26 M/uL  Hemoglobin : 15.7 g/dL  Hematocrit : 46.0 %  Platelet Count - Automated : 274 K/uL  Mean Cell Volume : 87.5 fL  Mean Cell Hemoglobin : 29.8 pg  Mean Cell Hemoglobin Concentration : 34.1 gm/dL  Auto Neutrophil # : 4.68 K/uL  Auto Lymphocyte # : 1.95 K/uL  Auto Monocyte # : 0.76 K/uL  Auto Eosinophil # : 0.11 K/uL  Auto Basophil # : 0.05 K/uL  Auto Neutrophil % : 61.7 %  Auto Lymphocyte % : 25.8 %  Auto Monocyte % : 10.0 %  Auto Eosinophil % : 1.5 %  Auto Basophil % : 0.7 %    09-12    140  |  103  |  15  ----------------------------<  109<H>  3.8   |  21<L>  |  1.17    Ca    9.6      12 Sep 2024 21:08    TPro  7.8  /  Alb  4.7  /  TBili  0.7  /  DBili  x   /  AST  23  /  ALT  22  /  AlkPhos  70  09-12

## 2024-09-13 NOTE — BH PATIENT PROFILE - HOME MEDICATIONS
sertraline 100 mg oral tablet , 1 tab(s) orally once a day  lamoTRIgine 100 mg oral tablet , 1 tab(s) orally once a day  prazosin 1 mg oral capsule , 1 cap(s) orally once a day (at bedtime)

## 2024-09-13 NOTE — BH SOCIAL WORK INITIAL PSYCHOSOCIAL EVALUATION - OTHER PAST PSYCHIATRIC HISTORY (INCLUDE DETAILS REGARDING ONSET, COURSE OF ILLNESS, INPATIENT/OUTPATIENT TREATMENT)
Psychiatric history of attention deficit hyperactivity disorder (on Straterra) OCD,  unspecified mood disorder on lexapro, with one prior suicide attempt (2014), a history of self-cutting, two prior inpatient psychiatric hospitalizations (10/2014 at [facility] and 11/2014 at [facility]), and 30 day treatment program at [facility], engaged in outpatient psychiatric treatment with Dr. Matthew, also with private psychologist/counseling Dr. Rocha  3 past psych hospitalization  1 past SA by hanging in 2016

## 2024-09-13 NOTE — BH PATIENT PROFILE - NSSBIRTUNABLEREM_GEN_A_CORE
PATRICIO CHAU  is a  52  female   who presents today for a  EGD-Colonoscopy   for   the indications listed below. The updated Patient Profile was reviewed prior to the procedure, in conjunction with the Physical Exam, including medical conditions, surgical procedures, medications, allergies, family history and social history. See Physical Exam time stamp below for date and time of HPI completion.Pre-operatively, I reviewed the indication(s) for the procedure, the risks of the procedure [including but not limited to: unexpected bleeding possibly requiring hospitalization and/or unplanned repeat procedures, perforation possibly requiring surgical treatment, missed lesions and complications of sedation/MAC (also explained by anesthesia staff)]. I have evaluated the patient for risks associated with the planned anesthesia and the procedure to be performed and find the patient an acceptable candidate for IV sedation.Multiple opportunities were provided for any questions or concerns, and all questions were answered satisfactorily before any anesthesia was administered. We will proceed with the planned procedure.BR
Never

## 2024-09-13 NOTE — BH INPATIENT PSYCHIATRY ASSESSMENT NOTE - DESCRIPTION
recently graduated from [employer] with a psychology degree, works as a behavioral specialist

## 2024-09-13 NOTE — BH INPATIENT PSYCHIATRY ASSESSMENT NOTE - NSBHCHARTREVIEWVS_PSY_A_CORE FT
Vital Signs Last 24 Hrs  T(C): 36.2 (09-13-24 @ 01:29), Max: 36.3 (09-12-24 @ 18:35)  T(F): 97.2 (09-13-24 @ 01:29), Max: 97.4 (09-12-24 @ 18:35)  HR: 84 (09-12-24 @ 18:35) (84 - 84)  BP: 137/89 (09-12-24 @ 18:35) (137/89 - 137/89)  BP(mean): --  RR: 18 (09-13-24 @ 01:29) (16 - 18)  SpO2: 98% (09-12-24 @ 18:35) (98% - 98%)    Orthostatic VS  09-13-24 @ 01:29  Lying BP: --/-- HR: --  Sitting BP: 136/95 HR: 70  Standing BP: 123/94 HR: 74  Site: --  Mode: --   Vital Signs Last 24 Hrs  T(C): 36.2 (09-13-24 @ 01:29), Max: 36.2 (09-13-24 @ 01:29)  T(F): 97.2 (09-13-24 @ 01:29), Max: 97.2 (09-13-24 @ 01:29)  HR: --  BP: --  BP(mean): --  RR: 18 (09-13-24 @ 01:29) (18 - 18)  SpO2: --    Orthostatic VS  09-13-24 @ 01:29  Lying BP: --/-- HR: --  Sitting BP: 136/95 HR: 70  Standing BP: 123/94 HR: 74  Site: --  Mode: --

## 2024-09-13 NOTE — BH PATIENT PROFILE - STATED REASON FOR ADMISSION
Pt stated" I am very sad. I sometimes wants to hurt myself. I banged myself in the head this afternoon"

## 2024-09-13 NOTE — BH INPATIENT PSYCHIATRY ASSESSMENT NOTE - NSBHMETABOLIC_PSY_ALL_CORE_FT
BMI: BMI (kg/m2): 27.4 (09-12-24 @ 18:35)  HbA1c:   Glucose:   BP: 137/89 (09-12-24 @ 18:35) (137/89 - 137/89)Vital Signs Last 24 Hrs  T(C): 36.2 (09-13-24 @ 01:29), Max: 36.3 (09-12-24 @ 18:35)  T(F): 97.2 (09-13-24 @ 01:29), Max: 97.4 (09-12-24 @ 18:35)  HR: 84 (09-12-24 @ 18:35) (84 - 84)  BP: 137/89 (09-12-24 @ 18:35) (137/89 - 137/89)  BP(mean): --  RR: 18 (09-13-24 @ 01:29) (16 - 18)  SpO2: 98% (09-12-24 @ 18:35) (98% - 98%)    Orthostatic VS  09-13-24 @ 01:29  Lying BP: --/-- HR: --  Sitting BP: 136/95 HR: 70  Standing BP: 123/94 HR: 74  Site: --  Mode: --    Lipid Panel:  BMI: BMI (kg/m2): 27.4 (09-12-24 @ 18:35)  HbA1c:   Glucose:   BP: 137/89 (09-12-24 @ 18:35) (137/89 - 137/89)Vital Signs Last 24 Hrs  T(C): 36.2 (09-13-24 @ 01:29), Max: 36.2 (09-13-24 @ 01:29)  T(F): 97.2 (09-13-24 @ 01:29), Max: 97.2 (09-13-24 @ 01:29)  HR: --  BP: --  BP(mean): --  RR: 18 (09-13-24 @ 01:29) (18 - 18)  SpO2: --    Orthostatic VS  09-13-24 @ 01:29  Lying BP: --/-- HR: --  Sitting BP: 136/95 HR: 70  Standing BP: 123/94 HR: 74  Site: --  Mode: --    Lipid Panel:

## 2024-09-13 NOTE — BH SOCIAL WORK INITIAL PSYCHOSOCIAL EVALUATION - DETAILS
Mother depression, anxiety, Sister - anxiety and eating disorder and some OCD traits, paternal side - substance use/alcohol use,
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
## 2024-09-13 NOTE — BH INPATIENT PSYCHIATRY ASSESSMENT NOTE - OTHER PAST PSYCHIATRIC HISTORY (INCLUDE DETAILS REGARDING ONSET, COURSE OF ILLNESS, INPATIENT/OUTPATIENT TREATMENT)
Psychiatric history of attention deficit hyperactivity disorder (on Straterra) OCD,  unspecified mood disorder on lexapro, with one prior suicide attempt (2014), a history of self-cutting, two prior inpatient psychiatric hospitalizations (10/2014 at [facility] and 11/2014 at [facility]), and 30 day treatment program at [facility], engaged in outpatient psychiatric treatment with Dr. Matthew, also with private psychologist/counseling Dr. Rocha  3 past psych hospitalization  1 past SA by hanging in 2016

## 2024-09-13 NOTE — BH INPATIENT PSYCHIATRY ASSESSMENT NOTE - NSBHCRANIAL_PSY_ALL_CORE
Recognizes 2 fingers or can read (II)/Normal speech (IX, X, XII)/Extraocular Eye Movement Intact  (III, IV, VI)/Shoulder shrug (XI)

## 2024-09-13 NOTE — PSYCHIATRIC REHAB INITIAL EVALUATION - NSBHEMPLOYERFT_PSY_ALL_CORE
[facility] [employer] [facility] [employer] (Full-time)  [facility] [facility] Services (Part-time)

## 2024-09-13 NOTE — BH INPATIENT PSYCHIATRY ASSESSMENT NOTE - CURRENT MEDICATION
MEDICATIONS  (STANDING):  escitalopram 20 milliGRAM(s) Oral at bedtime    MEDICATIONS  (PRN):  hydrOXYzine hydrochloride 50 milliGRAM(s) Oral every 6 hours PRN Anxiety  LORazepam     Tablet 1 milliGRAM(s) Oral at bedtime PRN insomnia  LORazepam     Tablet 2 milliGRAM(s) Oral every 6 hours PRN agitation secondary to mood dysregulation  LORazepam   Injectable 2 milliGRAM(s) IntraMuscular Once PRN severe agitation secondary to mood dysregulation

## 2024-09-14 LAB
CHOLEST SERPL-MCNC: 199 MG/DL — SIGNIFICANT CHANGE UP
HDLC SERPL-MCNC: 38 MG/DL — LOW
LIPID PNL WITH DIRECT LDL SERPL: 145 MG/DL — HIGH
NON HDL CHOLESTEROL: 161 MG/DL — HIGH
TRIGL SERPL-MCNC: 78 MG/DL — SIGNIFICANT CHANGE UP

## 2024-09-14 PROCEDURE — 99232 SBSQ HOSP IP/OBS MODERATE 35: CPT

## 2024-09-14 RX ADMIN — LORAZEPAM 1 MILLIGRAM(S): 4 INJECTION INTRAMUSCULAR; INTRAVENOUS at 22:47

## 2024-09-14 RX ADMIN — ESCITALOPRAM OXALATE 20 MILLIGRAM(S): 10 TABLET ORAL at 21:13

## 2024-09-14 RX ADMIN — Medication 50 MILLIGRAM(S): at 10:54

## 2024-09-14 NOTE — BH INPATIENT PSYCHIATRY PROGRESS NOTE - NSBHFUPINTERVALHXFT_PSY_A_CORE
Patient seen for follow up of OCD and SI  Chart reviewed and case discussed with nursing staff  No events overnight  Denies SE from escitalopram  Denies hopelessness and SI

## 2024-09-14 NOTE — BH INPATIENT PSYCHIATRY PROGRESS NOTE - PRN MEDS
MEDICATIONS  (PRN):  hydrOXYzine hydrochloride 50 milliGRAM(s) Oral every 6 hours PRN Anxiety  LORazepam     Tablet 1 milliGRAM(s) Oral at bedtime PRN insomnia  LORazepam     Tablet 2 milliGRAM(s) Oral every 6 hours PRN agitation secondary to mood dysregulation  LORazepam   Injectable 2 milliGRAM(s) IntraMuscular Once PRN severe agitation secondary to mood dysregulation

## 2024-09-14 NOTE — BH INPATIENT PSYCHIATRY PROGRESS NOTE - NSBHMETABOLIC_PSY_ALL_CORE_FT
BMI: BMI (kg/m2): 27.4 (09-12-24 @ 18:35)  HbA1c:   Glucose:   BP: 137/89 (09-12-24 @ 18:35) (137/89 - 137/89)Vital Signs Last 24 Hrs  T(C): 36.4 (09-14-24 @ 07:57), Max: 36.8 (09-13-24 @ 19:55)  T(F): 97.6 (09-14-24 @ 07:57), Max: 98.3 (09-13-24 @ 19:55)  HR: --  BP: --  BP(mean): --  RR: --  SpO2: --    Orthostatic VS  09-14-24 @ 07:57  Lying BP: --/-- HR: --  Sitting BP: 127/80 HR: 66  Standing BP: 117/92 HR: 80  Site: --  Mode: --  Orthostatic VS  09-13-24 @ 01:29  Lying BP: --/-- HR: --  Sitting BP: 136/95 HR: 70  Standing BP: 123/94 HR: 74  Site: --  Mode: --    Lipid Panel: Date/Time: 09-14-24 @ 09:15  Cholesterol, Serum: 199  LDL Cholesterol Calculated: 145  HDL Cholesterol, Serum: 38  Total Cholesterol/HDL Ration Measurement: --  Triglycerides, Serum: 78
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
## 2024-09-14 NOTE — BH INPATIENT PSYCHIATRY PROGRESS NOTE - NSBHCHARTREVIEWVS_PSY_A_CORE FT
Vital Signs Last 24 Hrs  T(C): 36.4 (09-14-24 @ 07:57), Max: 36.8 (09-13-24 @ 19:55)  T(F): 97.6 (09-14-24 @ 07:57), Max: 98.3 (09-13-24 @ 19:55)  HR: --  BP: --  BP(mean): --  RR: --  SpO2: --    Orthostatic VS  09-14-24 @ 07:57  Lying BP: --/-- HR: --  Sitting BP: 127/80 HR: 66  Standing BP: 117/92 HR: 80  Site: --  Mode: --  Orthostatic VS  09-13-24 @ 01:29  Lying BP: --/-- HR: --  Sitting BP: 136/95 HR: 70  Standing BP: 123/94 HR: 74  Site: --  Mode: --

## 2024-09-15 PROCEDURE — 99232 SBSQ HOSP IP/OBS MODERATE 35: CPT

## 2024-09-15 RX ORDER — LORAZEPAM 4 MG/ML
0.5 INJECTION INTRAMUSCULAR; INTRAVENOUS ONCE
Refills: 0 | Status: DISCONTINUED | OUTPATIENT
Start: 2024-09-15 | End: 2024-09-15

## 2024-09-15 RX ADMIN — Medication 50 MILLIGRAM(S): at 20:23

## 2024-09-15 RX ADMIN — ESCITALOPRAM OXALATE 20 MILLIGRAM(S): 10 TABLET ORAL at 20:23

## 2024-09-15 RX ADMIN — LORAZEPAM 1 MILLIGRAM(S): 4 INJECTION INTRAMUSCULAR; INTRAVENOUS at 22:02

## 2024-09-15 RX ADMIN — Medication 50 MILLIGRAM(S): at 11:32

## 2024-09-15 RX ADMIN — LORAZEPAM 0.5 MILLIGRAM(S): 4 INJECTION INTRAMUSCULAR; INTRAVENOUS at 14:33
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
## 2024-09-15 NOTE — BH INPATIENT PSYCHIATRY PROGRESS NOTE - NSBHCHARTREVIEWVS_PSY_A_CORE FT
Vital Signs Last 24 Hrs  T(C): 36.6 (09-15-24 @ 10:56), Max: 37.1 (09-15-24 @ 08:53)  T(F): 97.9 (09-15-24 @ 10:56), Max: 98.7 (09-15-24 @ 08:53)  HR: --  BP: --  BP(mean): --  RR: --  SpO2: --    Orthostatic VS  09-15-24 @ 10:56  Lying BP: --/-- HR: --  Sitting BP: 119/83 HR: 64  Standing BP: 117/86 HR: 95  Site: --  Mode: --  Orthostatic VS  09-15-24 @ 08:53  Lying BP: --/-- HR: --  Sitting BP: 125/63 HR: 77  Standing BP: 115/66 HR: 93  Site: --  Mode: --  Orthostatic VS  09-14-24 @ 07:57  Lying BP: --/-- HR: --  Sitting BP: 127/80 HR: 66  Standing BP: 117/92 HR: 80  Site: --  Mode: --

## 2024-09-15 NOTE — BH INPATIENT PSYCHIATRY PROGRESS NOTE - NSBHTIMEACTIVITIESPERFORMED_PSY_A_CORE
Time spent with patient included patient interview, reviewing chart and charting and reviewing case with nursing staff  
Time spent with patient included patient interview, reviewing chart and charting and reviewing case with nursing staff

## 2024-09-15 NOTE — BH INPATIENT PSYCHIATRY PROGRESS NOTE - NSBHFUPINTERVALHXFT_PSY_A_CORE
Patient seen for follow up of OCD and SI  Chart reviewed and case discussed with nursing staff  No events overnight  Feels that medications are helping him  Denies SE from escitalopram  Denies hopelessness and SI

## 2024-09-15 NOTE — BH INPATIENT PSYCHIATRY PROGRESS NOTE - NSBHMETABOLIC_PSY_ALL_CORE_FT
BMI: BMI (kg/m2): 27.4 (09-15-24 @ 08:53)  HbA1c:   Glucose:   BP: 137/89 (09-12-24 @ 18:35) (137/89 - 137/89)Vital Signs Last 24 Hrs  T(C): 36.6 (09-15-24 @ 10:56), Max: 37.1 (09-15-24 @ 08:53)  T(F): 97.9 (09-15-24 @ 10:56), Max: 98.7 (09-15-24 @ 08:53)  HR: --  BP: --  BP(mean): --  RR: --  SpO2: --    Orthostatic VS  09-15-24 @ 10:56  Lying BP: --/-- HR: --  Sitting BP: 119/83 HR: 64  Standing BP: 117/86 HR: 95  Site: --  Mode: --  Orthostatic VS  09-15-24 @ 08:53  Lying BP: --/-- HR: --  Sitting BP: 125/63 HR: 77  Standing BP: 115/66 HR: 93  Site: --  Mode: --  Orthostatic VS  09-14-24 @ 07:57  Lying BP: --/-- HR: --  Sitting BP: 127/80 HR: 66  Standing BP: 117/92 HR: 80  Site: --  Mode: --    Lipid Panel: Date/Time: 09-14-24 @ 09:15  Cholesterol, Serum: 199  LDL Cholesterol Calculated: 145  HDL Cholesterol, Serum: 38  Total Cholesterol/HDL Ration Measurement: --  Triglycerides, Serum: 78

## 2024-09-16 PROCEDURE — 99232 SBSQ HOSP IP/OBS MODERATE 35: CPT

## 2024-09-16 PROCEDURE — 90853 GROUP PSYCHOTHERAPY: CPT

## 2024-09-16 RX ADMIN — Medication 50 MILLIGRAM(S): at 10:43

## 2024-09-16 RX ADMIN — ESCITALOPRAM OXALATE 20 MILLIGRAM(S): 10 TABLET ORAL at 20:45

## 2024-09-16 RX ADMIN — Medication 50 MILLIGRAM(S): at 20:47

## 2024-09-16 RX ADMIN — LORAZEPAM 1 MILLIGRAM(S): 4 INJECTION INTRAMUSCULAR; INTRAVENOUS at 22:37
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
## 2024-09-16 NOTE — BH INPATIENT PSYCHIATRY PROGRESS NOTE - NSBHFUPINTERVALHXFT_PSY_A_CORE
Patient seen for follow up of OCD and SI. Chart reviewed and case discussed with nursing staff. No events overnight. Feels that medications are helping him. Denies hopelessness and SI. Shared journal entries about ruminative, negative, intrusive thoughts related to father, family, other patients, personal trauma.  Patient seen for follow up of suspected BPD and SI. Chart reviewed and case discussed with nursing staff. No events overnight. Feels that medications are helping him. Denies hopelessness and SI. Shared journal entries about ruminative, negative, intrusive thoughts related to father, family, other patients, personal trauma, but patient endorsing subjective improvements, to start individual therapy.

## 2024-09-16 NOTE — BH INPATIENT PSYCHIATRY PROGRESS NOTE - CURRENT MEDICATION
MEDICATIONS  (STANDING):  escitalopram 20 milliGRAM(s) Oral at bedtime    MEDICATIONS  (PRN):  hydrOXYzine hydrochloride 50 milliGRAM(s) Oral every 6 hours PRN Anxiety  LORazepam     Tablet 1 milliGRAM(s) Oral at bedtime PRN insomnia  LORazepam     Tablet 2 milliGRAM(s) Oral every 6 hours PRN agitation secondary to mood dysregulation  LORazepam   Injectable 2 milliGRAM(s) IntraMuscular Once PRN severe agitation secondary to mood dysregulation   MEDICATIONS  (STANDING):  escitalopram 20 milliGRAM(s) Oral at bedtime    MEDICATIONS  (PRN):  hydrOXYzine hydrochloride 50 milliGRAM(s) Oral every 6 hours PRN Anxiety  LORazepam     Tablet 2 milliGRAM(s) Oral every 6 hours PRN agitation secondary to mood dysregulation  LORazepam     Tablet 1 milliGRAM(s) Oral at bedtime PRN insomnia  LORazepam   Injectable 2 milliGRAM(s) IntraMuscular Once PRN severe agitation secondary to mood dysregulation

## 2024-09-16 NOTE — BH INPATIENT PSYCHIATRY PROGRESS NOTE - NSBHCHARTREVIEWVS_PSY_A_CORE FT
Vital Signs Last 24 Hrs  T(C): 36.4 (09-16-24 @ 08:37), Max: 36.6 (09-15-24 @ 20:30)  T(F): 97.5 (09-16-24 @ 08:37), Max: 97.8 (09-15-24 @ 20:30)  HR: --  BP: --  BP(mean): --  RR: --  SpO2: --    Orthostatic VS  09-16-24 @ 08:37  Lying BP: --/-- HR: --  Sitting BP: 127/79 HR: 74  Standing BP: 125/72 HR: 87  Site: upper right arm  Mode: electronic  Orthostatic VS  09-15-24 @ 10:56  Lying BP: --/-- HR: --  Sitting BP: 119/83 HR: 64  Standing BP: 117/86 HR: 95  Site: --  Mode: --  Orthostatic VS  09-15-24 @ 08:53  Lying BP: --/-- HR: --  Sitting BP: 125/63 HR: 77  Standing BP: 115/66 HR: 93  Site: --  Mode: --   Vital Signs Last 24 Hrs  T(C): 36.6 (09-16-24 @ 20:51), Max: 36.6 (09-16-24 @ 20:51)  T(F): 97.8 (09-16-24 @ 20:51), Max: 97.8 (09-16-24 @ 20:51)  HR: --  BP: --  BP(mean): --  RR: --  SpO2: --    Orthostatic VS  09-16-24 @ 08:37  Lying BP: --/-- HR: --  Sitting BP: 127/79 HR: 74  Standing BP: 125/72 HR: 87  Site: upper right arm  Mode: electronic  Orthostatic VS  09-15-24 @ 10:56  Lying BP: --/-- HR: --  Sitting BP: 119/83 HR: 64  Standing BP: 117/86 HR: 95  Site: --  Mode: --  Orthostatic VS  09-15-24 @ 08:53  Lying BP: --/-- HR: --  Sitting BP: 125/63 HR: 77  Standing BP: 115/66 HR: 93  Site: --  Mode: --

## 2024-09-16 NOTE — BH INPATIENT PSYCHIATRY PROGRESS NOTE - PRN MEDS
MEDICATIONS  (PRN):  hydrOXYzine hydrochloride 50 milliGRAM(s) Oral every 6 hours PRN Anxiety  LORazepam     Tablet 1 milliGRAM(s) Oral at bedtime PRN insomnia  LORazepam     Tablet 2 milliGRAM(s) Oral every 6 hours PRN agitation secondary to mood dysregulation  LORazepam   Injectable 2 milliGRAM(s) IntraMuscular Once PRN severe agitation secondary to mood dysregulation   MEDICATIONS  (PRN):  hydrOXYzine hydrochloride 50 milliGRAM(s) Oral every 6 hours PRN Anxiety  LORazepam     Tablet 2 milliGRAM(s) Oral every 6 hours PRN agitation secondary to mood dysregulation  LORazepam     Tablet 1 milliGRAM(s) Oral at bedtime PRN insomnia  LORazepam   Injectable 2 milliGRAM(s) IntraMuscular Once PRN severe agitation secondary to mood dysregulation

## 2024-09-16 NOTE — BH INPATIENT PSYCHIATRY PROGRESS NOTE - NSBHMETABOLIC_PSY_ALL_CORE_FT
BMI: BMI (kg/m2): 27.4 (09-15-24 @ 08:53)  HbA1c:   Glucose:   BP: --Vital Signs Last 24 Hrs  T(C): 36.4 (09-16-24 @ 08:37), Max: 36.6 (09-15-24 @ 20:30)  T(F): 97.5 (09-16-24 @ 08:37), Max: 97.8 (09-15-24 @ 20:30)  HR: --  BP: --  BP(mean): --  RR: --  SpO2: --    Orthostatic VS  09-16-24 @ 08:37  Lying BP: --/-- HR: --  Sitting BP: 127/79 HR: 74  Standing BP: 125/72 HR: 87  Site: upper right arm  Mode: electronic  Orthostatic VS  09-15-24 @ 10:56  Lying BP: --/-- HR: --  Sitting BP: 119/83 HR: 64  Standing BP: 117/86 HR: 95  Site: --  Mode: --  Orthostatic VS  09-15-24 @ 08:53  Lying BP: --/-- HR: --  Sitting BP: 125/63 HR: 77  Standing BP: 115/66 HR: 93  Site: --  Mode: --    Lipid Panel: Date/Time: 09-14-24 @ 09:15  Cholesterol, Serum: 199  LDL Cholesterol Calculated: 145  HDL Cholesterol, Serum: 38  Total Cholesterol/HDL Ration Measurement: --  Triglycerides, Serum: 78   BMI: BMI (kg/m2): 27.4 (09-15-24 @ 08:53)  HbA1c:   Glucose:   BP: --Vital Signs Last 24 Hrs  T(C): 36.6 (09-16-24 @ 20:51), Max: 36.6 (09-16-24 @ 20:51)  T(F): 97.8 (09-16-24 @ 20:51), Max: 97.8 (09-16-24 @ 20:51)  HR: --  BP: --  BP(mean): --  RR: --  SpO2: --    Orthostatic VS  09-16-24 @ 08:37  Lying BP: --/-- HR: --  Sitting BP: 127/79 HR: 74  Standing BP: 125/72 HR: 87  Site: upper right arm  Mode: electronic  Orthostatic VS  09-15-24 @ 10:56  Lying BP: --/-- HR: --  Sitting BP: 119/83 HR: 64  Standing BP: 117/86 HR: 95  Site: --  Mode: --  Orthostatic VS  09-15-24 @ 08:53  Lying BP: --/-- HR: --  Sitting BP: 125/63 HR: 77  Standing BP: 115/66 HR: 93  Site: --  Mode: --    Lipid Panel: Date/Time: 09-14-24 @ 09:15  Cholesterol, Serum: 199  LDL Cholesterol Calculated: 145  HDL Cholesterol, Serum: 38  Total Cholesterol/HDL Ration Measurement: --  Triglycerides, Serum: 78

## 2024-09-17 ENCOUNTER — NON-APPOINTMENT (OUTPATIENT)
Age: 24
End: 2024-09-17

## 2024-09-17 PROCEDURE — 99232 SBSQ HOSP IP/OBS MODERATE 35: CPT

## 2024-09-17 PROCEDURE — 90853 GROUP PSYCHOTHERAPY: CPT

## 2024-09-17 RX ORDER — TRAZODONE HCL 50 MG
50 TABLET ORAL AT BEDTIME
Refills: 0 | Status: DISCONTINUED | OUTPATIENT
Start: 2024-09-17 | End: 2024-09-20

## 2024-09-17 RX ADMIN — Medication 50 MILLIGRAM(S): at 13:02

## 2024-09-17 RX ADMIN — ESCITALOPRAM OXALATE 20 MILLIGRAM(S): 10 TABLET ORAL at 21:21

## 2024-09-17 RX ADMIN — Medication 50 MILLIGRAM(S): at 22:56

## 2024-09-17 RX ADMIN — Medication 25 MILLIGRAM(S): at 02:15

## 2024-09-17 NOTE — BH INPATIENT PSYCHIATRY PROGRESS NOTE - NSBHFUPINTERVALHXFT_PSY_A_CORE
Chart reviewed and case discussed with nursing staff. No events overnight. Feels that medications are helping him. Denies hopelessness and SI. Attributes improvements to stopping Straterra, increasing Lexapro, and using Atarax for when he feels anxious. Utilizing coping skills learning in group and from DBT for when he is feeling dysphoric or having intense intrusive thoughts. Denied SI, HI, AVH.  Chart reviewed and case discussed with nursing staff. No events overnight, but poor sleep however pt explains this is not likely related to lexapro as he has this occur a couple of times per week. Feels that medications are helping him. Denies hopelessness and SI. Attributes improvements to stopping Straterra, increasing Lexapro, and using Atarax for when he feels anxious. Utilizing coping skills learning in group and from DBT for when he is feeling dysphoric or having intense intrusive thoughts. Denied SI, HI, AVH.

## 2024-09-17 NOTE — BH PSYCHOLOGY - CLINICIAN PSYCHOTHERAPY NOTE - TOKEN PULL-DIAGNOSIS
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

## 2024-09-17 NOTE — BH INPATIENT PSYCHIATRY PROGRESS NOTE - NSBHCHARTREVIEWVS_PSY_A_CORE FT
Vital Signs Last 24 Hrs  T(C): 36.4 (09-17-24 @ 08:54), Max: 36.6 (09-16-24 @ 20:51)  T(F): 97.5 (09-17-24 @ 08:54), Max: 97.8 (09-16-24 @ 20:51)  HR: --  BP: --  BP(mean): --  RR: 17 (09-17-24 @ 08:54) (17 - 17)  SpO2: --    Orthostatic VS  09-17-24 @ 08:54  Lying BP: --/-- HR: --  Sitting BP: 116/83 HR: 64  Standing BP: 117/82 HR: 84  Site: --  Mode: --  Orthostatic VS  09-16-24 @ 08:37  Lying BP: --/-- HR: --  Sitting BP: 127/79 HR: 74  Standing BP: 125/72 HR: 87  Site: upper right arm  Mode: electronic   Vital Signs Last 24 Hrs  T(C): 36.2 (09-18-24 @ 06:06), Max: 37.1 (09-17-24 @ 20:49)  T(F): 97.2 (09-18-24 @ 06:06), Max: 98.7 (09-17-24 @ 20:49)  HR: --  BP: --  BP(mean): --  RR: 18 (09-18-24 @ 06:06) (17 - 18)  SpO2: --    Orthostatic VS  09-18-24 @ 06:06  Lying BP: --/-- HR: --  Sitting BP: 118/76 HR: 57  Standing BP: 116/70 HR: 64  Site: --  Mode: --  Orthostatic VS  09-17-24 @ 08:54  Lying BP: --/-- HR: --  Sitting BP: 116/83 HR: 64  Standing BP: 117/82 HR: 84  Site: --  Mode: --  Orthostatic VS  09-16-24 @ 08:37  Lying BP: --/-- HR: --  Sitting BP: 127/79 HR: 74  Standing BP: 125/72 HR: 87  Site: upper right arm  Mode: electronic

## 2024-09-17 NOTE — BH INPATIENT PSYCHIATRY PROGRESS NOTE - NSBHMETABOLIC_PSY_ALL_CORE_FT
BMI: BMI (kg/m2): 27.4 (09-15-24 @ 08:53)  HbA1c:   Glucose:   BP: --Vital Signs Last 24 Hrs  T(C): 36.4 (09-17-24 @ 08:54), Max: 36.6 (09-16-24 @ 20:51)  T(F): 97.5 (09-17-24 @ 08:54), Max: 97.8 (09-16-24 @ 20:51)  HR: --  BP: --  BP(mean): --  RR: 17 (09-17-24 @ 08:54) (17 - 17)  SpO2: --    Orthostatic VS  09-17-24 @ 08:54  Lying BP: --/-- HR: --  Sitting BP: 116/83 HR: 64  Standing BP: 117/82 HR: 84  Site: --  Mode: --  Orthostatic VS  09-16-24 @ 08:37  Lying BP: --/-- HR: --  Sitting BP: 127/79 HR: 74  Standing BP: 125/72 HR: 87  Site: upper right arm  Mode: electronic    Lipid Panel: Date/Time: 09-14-24 @ 09:15  Cholesterol, Serum: 199  LDL Cholesterol Calculated: 145  HDL Cholesterol, Serum: 38  Total Cholesterol/HDL Ration Measurement: --  Triglycerides, Serum: 78   BMI: BMI (kg/m2): 27.4 (09-15-24 @ 08:53)  HbA1c:   Glucose:   BP: --Vital Signs Last 24 Hrs  T(C): 36.2 (09-18-24 @ 06:06), Max: 37.1 (09-17-24 @ 20:49)  T(F): 97.2 (09-18-24 @ 06:06), Max: 98.7 (09-17-24 @ 20:49)  HR: --  BP: --  BP(mean): --  RR: 18 (09-18-24 @ 06:06) (17 - 18)  SpO2: --    Orthostatic VS  09-18-24 @ 06:06  Lying BP: --/-- HR: --  Sitting BP: 118/76 HR: 57  Standing BP: 116/70 HR: 64  Site: --  Mode: --  Orthostatic VS  09-17-24 @ 08:54  Lying BP: --/-- HR: --  Sitting BP: 116/83 HR: 64  Standing BP: 117/82 HR: 84  Site: --  Mode: --  Orthostatic VS  09-16-24 @ 08:37  Lying BP: --/-- HR: --  Sitting BP: 127/79 HR: 74  Standing BP: 125/72 HR: 87  Site: upper right arm  Mode: electronic    Lipid Panel: Date/Time: 09-14-24 @ 09:15  Cholesterol, Serum: 199  LDL Cholesterol Calculated: 145  HDL Cholesterol, Serum: 38  Total Cholesterol/HDL Ration Measurement: --  Triglycerides, Serum: 78

## 2024-09-17 NOTE — BH INPATIENT PSYCHIATRY PROGRESS NOTE - PRN MEDS
MEDICATIONS  (PRN):  hydrOXYzine hydrochloride 50 milliGRAM(s) Oral every 6 hours PRN Anxiety  LORazepam     Tablet 2 milliGRAM(s) Oral every 6 hours PRN agitation secondary to mood dysregulation  LORazepam     Tablet 1 milliGRAM(s) Oral at bedtime PRN insomnia  LORazepam   Injectable 2 milliGRAM(s) IntraMuscular Once PRN severe agitation secondary to mood dysregulation   MEDICATIONS  (PRN):  hydrOXYzine hydrochloride 50 milliGRAM(s) Oral every 6 hours PRN Anxiety  LORazepam     Tablet 1 milliGRAM(s) Oral at bedtime PRN insomnia  LORazepam     Tablet 2 milliGRAM(s) Oral every 6 hours PRN agitation secondary to mood dysregulation  LORazepam   Injectable 2 milliGRAM(s) IntraMuscular Once PRN severe agitation secondary to mood dysregulation  traZODone 50 milliGRAM(s) Oral at bedtime PRN insomnia

## 2024-09-17 NOTE — BH INPATIENT PSYCHIATRY PROGRESS NOTE - CURRENT MEDICATION
MEDICATIONS  (STANDING):  escitalopram 20 milliGRAM(s) Oral at bedtime    MEDICATIONS  (PRN):  hydrOXYzine hydrochloride 50 milliGRAM(s) Oral every 6 hours PRN Anxiety  LORazepam     Tablet 2 milliGRAM(s) Oral every 6 hours PRN agitation secondary to mood dysregulation  LORazepam     Tablet 1 milliGRAM(s) Oral at bedtime PRN insomnia  LORazepam   Injectable 2 milliGRAM(s) IntraMuscular Once PRN severe agitation secondary to mood dysregulation   MEDICATIONS  (STANDING):  escitalopram 20 milliGRAM(s) Oral at bedtime    MEDICATIONS  (PRN):  hydrOXYzine hydrochloride 50 milliGRAM(s) Oral every 6 hours PRN Anxiety  LORazepam     Tablet 1 milliGRAM(s) Oral at bedtime PRN insomnia  LORazepam     Tablet 2 milliGRAM(s) Oral every 6 hours PRN agitation secondary to mood dysregulation  LORazepam   Injectable 2 milliGRAM(s) IntraMuscular Once PRN severe agitation secondary to mood dysregulation  traZODone 50 milliGRAM(s) Oral at bedtime PRN insomnia

## 2024-09-17 NOTE — BH INPATIENT PSYCHIATRY PROGRESS NOTE - NSBHFUPINTERVALCCFT_PSY_A_CORE
"I'm feeling like my old self" Patient seen for follow up of suspected BPD and SI. "I'm feeling more like my old self" Patient seen for follow up of suspected BPD and SI.
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
## 2024-09-17 NOTE — DISCUSSION/SUMMARY
[FreeTextEntry1] : Spoke to Dr. Steve Bhatia psychiatrist at [facility] at [facility]. Patient was admitted for depression, anxiety, SI and self injurous behavior (Head banging)  Doctor feels he has BPD (girlfriend related, and job related), and subtle autistic like features (socially awkward).   PLAN:  D/C Strattera Increase Lexapro from 15 to 20 mg (States he increased it on Friday Sept 13) Plan for discharge is either this week or next week.

## 2024-09-18 PROCEDURE — 90853 GROUP PSYCHOTHERAPY: CPT

## 2024-09-18 RX ORDER — LORAZEPAM 4 MG/ML
0.5 INJECTION INTRAMUSCULAR; INTRAVENOUS ONCE
Refills: 0 | Status: DISCONTINUED | OUTPATIENT
Start: 2024-09-18 | End: 2024-09-20

## 2024-09-18 RX ORDER — LORAZEPAM 4 MG/ML
1 INJECTION INTRAMUSCULAR; INTRAVENOUS AT BEDTIME
Refills: 0 | Status: DISCONTINUED | OUTPATIENT
Start: 2024-09-18 | End: 2024-09-20

## 2024-09-18 RX ORDER — LORAZEPAM 4 MG/ML
2 INJECTION INTRAMUSCULAR; INTRAVENOUS ONCE
Refills: 0 | Status: DISCONTINUED | OUTPATIENT
Start: 2024-09-18 | End: 2024-09-20

## 2024-09-18 RX ORDER — LORAZEPAM 4 MG/ML
2 INJECTION INTRAMUSCULAR; INTRAVENOUS EVERY 6 HOURS
Refills: 0 | Status: DISCONTINUED | OUTPATIENT
Start: 2024-09-18 | End: 2024-09-20

## 2024-09-18 RX ADMIN — Medication 50 MILLIGRAM(S): at 22:38

## 2024-09-18 RX ADMIN — ESCITALOPRAM OXALATE 20 MILLIGRAM(S): 10 TABLET ORAL at 20:58

## 2024-09-18 RX ADMIN — Medication 50 MILLIGRAM(S): at 13:54

## 2024-09-18 NOTE — BH INPATIENT PSYCHIATRY PROGRESS NOTE - NSBHMETABOLIC_PSY_ALL_CORE_FT
BMI: BMI (kg/m2): 27.4 (09-15-24 @ 08:53)  HbA1c:   Glucose:   BP: --Vital Signs Last 24 Hrs  T(C): 36.2 (09-18-24 @ 06:06), Max: 37.1 (09-17-24 @ 20:49)  T(F): 97.2 (09-18-24 @ 06:06), Max: 98.7 (09-17-24 @ 20:49)  HR: --  BP: --  BP(mean): --  RR: 18 (09-18-24 @ 06:06) (18 - 18)  SpO2: --    Orthostatic VS  09-18-24 @ 06:06  Lying BP: --/-- HR: --  Sitting BP: 118/76 HR: 57  Standing BP: 116/70 HR: 64  Site: --  Mode: --  Orthostatic VS  09-17-24 @ 08:54  Lying BP: --/-- HR: --  Sitting BP: 116/83 HR: 64  Standing BP: 117/82 HR: 84  Site: --  Mode: --    Lipid Panel: Date/Time: 09-14-24 @ 09:15  Cholesterol, Serum: 199  LDL Cholesterol Calculated: 145  HDL Cholesterol, Serum: 38  Total Cholesterol/HDL Ration Measurement: --  Triglycerides, Serum: 78   BMI: BMI (kg/m2): 27.4 (09-15-24 @ 08:53)  HbA1c:   Glucose:   BP: --Vital Signs Last 24 Hrs  T(C): 35.2 (09-18-24 @ 20:30), Max: 35.2 (09-18-24 @ 20:30)  T(F): 95.4 (09-18-24 @ 20:30), Max: 95.4 (09-18-24 @ 20:30)  HR: --  BP: --  BP(mean): --  RR: --  SpO2: --    Orthostatic VS  09-18-24 @ 06:06  Lying BP: --/-- HR: --  Sitting BP: 118/76 HR: 57  Standing BP: 116/70 HR: 64  Site: --  Mode: --  Orthostatic VS  09-17-24 @ 08:54  Lying BP: --/-- HR: --  Sitting BP: 116/83 HR: 64  Standing BP: 117/82 HR: 84  Site: --  Mode: --    Lipid Panel: Date/Time: 09-14-24 @ 09:15  Cholesterol, Serum: 199  LDL Cholesterol Calculated: 145  HDL Cholesterol, Serum: 38  Total Cholesterol/HDL Ration Measurement: --  Triglycerides, Serum: 78

## 2024-09-18 NOTE — BH PSYCHOLOGY - GROUP THERAPY NOTE - NSPSYCHOLGRPDBTPT_PSY_A_CORE FT
DBT Group is a group in which patients learn skills to better manage their emotions and behaviors. Group begins with a mindfulness practice so that patients have an opportunity to practice observing their internal and external experiences.  Following the mindfulness exercise the group learns new skills in a didactic format. Group today focused on the “emotion regulation” module.  Specifically, patients learned about the skill of Problem Solving, which is a method of managing difficult situations when an emotion is justified by the situation.  Patients were taught the seven steps of problem solving and provided with an example of a situation in which the skill would be useful.  Patients were also asked to think about when the skill would be helpful in their lives and how to apply the steps. Patients were also provided with a worksheet in order to help them practice the skill. 
DBT Group is a group in which patients learn skills to better manage their emotions and behaviors. Group begins with a mindfulness practice so that patients have an opportunity to practice observing their internal and external experiences. Following the mindfulness exercise the group learns new skills in a didactic format. Group today focused on the “distress tolerance” module, which are skills to help patients manage their crisis urges. Specifically, patients learned “TIPP” skills, which are skills to use when patients are highly distressed (at least 70/100) and are unable to think effectively to use other skills. These skills involve “Tipping” body chemistry through using temperature, intense exercise, paced breathing and progressive muscle relaxation. Each skill was reviewed and patients practiced progressive muscle relaxation and paced breathing in the session. Patients were encouraged to practice these skills while on the unit. 
DBT Group is a group in which patients learn skills to better manage their emotions and behaviors. Group begins with a mindfulness practice so that patients have an opportunity to practice observing their internal and external experiences.  Following the mindfulness exercise the group learns new skills in a didactic format. Group today focused on the “emotion regulation” module.  Specifically, patients learned the skills of “check the facts,” and “opposite action.” “Check the facts” is about being more realistic about interpretations and assumptions and challenging them appropriately to think more rationally, and “opposite action” involves acting opposite to problematic urges that come with emotions.  provided an example of checking the facts, and patients were encouraged to think about how these skills, and were assigned homework to practice. 
DBT Group is a group in which patients learn skills to better manage their emotions and behaviors. Group begins with a mindfulness practice so that patients have an opportunity to practice observing their internal and external experiences.  Following the mindfulness exercise the group learns new skills in a didactic format. Group today focused on the “emotion regulation” module.  Specifically, patients learned the skill of “check the facts.” “Check the facts” is about being more realistic about interpretations and assumptions and challenging them appropriately to think more rationally.  provided an example of checking the facts, and patients were encouraged to think about how these skills, and were assigned homework to practice.

## 2024-09-18 NOTE — BH PSYCHOLOGY - GROUP THERAPY NOTE - NSPSYCHOLGRPDBTPT_PSY_A_CORE
stable mood and affect in group/no self-destructive impulses/behaviors/Patient able to identify mood states/other...
stable mood and affect in group/no self-destructive impulses/behaviors/other...
stable mood and affect in group/no self-destructive impulses/behaviors/Patient able to identify mood states/other...
stable mood and affect in group/no self-destructive impulses/behaviors/Patient able to identify mood states/other...
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
## 2024-09-18 NOTE — BH PSYCHOLOGY - GROUP THERAPY NOTE - NSPSYCHOLGRPDBTINT_PSY_A_CORE FT
taught distress tolerance skill 
taught emotion regulation skill 

## 2024-09-18 NOTE — BH PSYCHOLOGY - GROUP THERAPY NOTE - NSPSYCHOLGRPDBTPROB_PSY_A_CORE
anxiety/depressed mood/suicidal ideation

## 2024-09-18 NOTE — BH INPATIENT PSYCHIATRY PROGRESS NOTE - CURRENT MEDICATION
MEDICATIONS  (STANDING):  escitalopram 20 milliGRAM(s) Oral at bedtime    MEDICATIONS  (PRN):  hydrOXYzine hydrochloride 50 milliGRAM(s) Oral every 6 hours PRN Anxiety  LORazepam     Tablet 2 milliGRAM(s) Oral every 6 hours PRN agitation secondary to mood dysregulation  LORazepam     Tablet 0.5 milliGRAM(s) Oral once PRN anxiety  LORazepam     Tablet 1 milliGRAM(s) Oral at bedtime PRN insomnia  LORazepam   Injectable 2 milliGRAM(s) IntraMuscular once PRN severe agitation secondary to mood dysregulation  traZODone 50 milliGRAM(s) Oral at bedtime PRN insomnia

## 2024-09-18 NOTE — BH INPATIENT PSYCHIATRY PROGRESS NOTE - NSBHFUPINTERVALHXFT_PSY_A_CORE
Chart reviewed and case discussed with nursing staff. No events overnight, slept through the night. Euthymic and talkative on interview. Shared about coping mechanisms he will use after discharge when he is feeling dysregulated. Endorses thoughts about setting boundaries and his relationships with family, his girlfriend, and his work clients. Endorses continued intrusive thoughts, but says they are no longer as emotionally troubling. Asked about discharge planning. Feels that medications are helping him. Denies hopelessness and SI      , HI, AVH.  Chart reviewed and case discussed with nursing staff. No events overnight, slept through the night. Euthymic and talkative on interview. Shared about coping mechanisms he will use after discharge when he is feeling dysregulated. Endorses thoughts about setting boundaries and his relationships with family, his girlfriend, and his work clients. Endorses continued intrusive thoughts, but says they are no longer as emotionally troubling. Asked about discharge planning. Feels that medications are helping him. Denies hopelessness and SI, HI, AVH.

## 2024-09-18 NOTE — BH INPATIENT PSYCHIATRY PROGRESS NOTE - NSBHCHARTREVIEWVS_PSY_A_CORE FT
Vital Signs Last 24 Hrs  T(C): 36.2 (09-18-24 @ 06:06), Max: 37.1 (09-17-24 @ 20:49)  T(F): 97.2 (09-18-24 @ 06:06), Max: 98.7 (09-17-24 @ 20:49)  HR: --  BP: --  BP(mean): --  RR: 18 (09-18-24 @ 06:06) (18 - 18)  SpO2: --    Orthostatic VS  09-18-24 @ 06:06  Lying BP: --/-- HR: --  Sitting BP: 118/76 HR: 57  Standing BP: 116/70 HR: 64  Site: --  Mode: --  Orthostatic VS  09-17-24 @ 08:54  Lying BP: --/-- HR: --  Sitting BP: 116/83 HR: 64  Standing BP: 117/82 HR: 84  Site: --  Mode: --   Vital Signs Last 24 Hrs  T(C): 35.2 (09-18-24 @ 20:30), Max: 35.2 (09-18-24 @ 20:30)  T(F): 95.4 (09-18-24 @ 20:30), Max: 95.4 (09-18-24 @ 20:30)  HR: --  BP: --  BP(mean): --  RR: --  SpO2: --    Orthostatic VS  09-18-24 @ 06:06  Lying BP: --/-- HR: --  Sitting BP: 118/76 HR: 57  Standing BP: 116/70 HR: 64  Site: --  Mode: --  Orthostatic VS  09-17-24 @ 08:54  Lying BP: --/-- HR: --  Sitting BP: 116/83 HR: 64  Standing BP: 117/82 HR: 84  Site: --  Mode: --

## 2024-09-18 NOTE — BH INPATIENT PSYCHIATRY PROGRESS NOTE - PRN MEDS
MEDICATIONS  (PRN):  hydrOXYzine hydrochloride 50 milliGRAM(s) Oral every 6 hours PRN Anxiety  LORazepam     Tablet 2 milliGRAM(s) Oral every 6 hours PRN agitation secondary to mood dysregulation  LORazepam     Tablet 0.5 milliGRAM(s) Oral once PRN anxiety  LORazepam     Tablet 1 milliGRAM(s) Oral at bedtime PRN insomnia  LORazepam   Injectable 2 milliGRAM(s) IntraMuscular once PRN severe agitation secondary to mood dysregulation  traZODone 50 milliGRAM(s) Oral at bedtime PRN insomnia

## 2024-09-19 DIAGNOSIS — F41.9 ANXIETY DISORDER, UNSPECIFIED: ICD-10-CM

## 2024-09-19 PROCEDURE — 90853 GROUP PSYCHOTHERAPY: CPT

## 2024-09-19 PROCEDURE — 99232 SBSQ HOSP IP/OBS MODERATE 35: CPT

## 2024-09-19 RX ORDER — TRAZODONE HCL 50 MG
1 TABLET ORAL
Qty: 10 | Refills: 0
Start: 2024-09-19 | End: 2024-09-28

## 2024-09-19 RX ORDER — HYDROXYZINE HCL 25 MG
1 TABLET ORAL
Qty: 7 | Refills: 0
Start: 2024-09-19 | End: 2024-09-25

## 2024-09-19 RX ORDER — ESCITALOPRAM OXALATE 10 MG/1
1 TABLET ORAL
Qty: 30 | Refills: 0
Start: 2024-09-19 | End: 2024-10-18

## 2024-09-19 RX ADMIN — Medication 50 MILLIGRAM(S): at 17:40

## 2024-09-19 RX ADMIN — ESCITALOPRAM OXALATE 20 MILLIGRAM(S): 10 TABLET ORAL at 20:57

## 2024-09-19 RX ADMIN — Medication 50 MILLIGRAM(S): at 21:43

## 2024-09-19 NOTE — BH INPATIENT PSYCHIATRY PROGRESS NOTE - NSCGISEVERILLNESS_PSY_ALL_CORE
5 = Markedly ill - intrusive symptoms that distinctly impair social/occupational function or cause intrusive levels of distress
3 = Mildly ill – clearly established symptoms with minimal, if any, distress or difficulty in social and occupational function
3 = Mildly ill – clearly established symptoms with minimal, if any, distress or difficulty in social and occupational function

## 2024-09-19 NOTE — BH INPATIENT PSYCHIATRY PROGRESS NOTE - NSBHATTESTCOMMENTATTENDFT_PSY_A_CORE
Patient seen, chart reviewed, case discussed with team.  I saw the patient with the resident, discussed case with resident and reviewed all sections of the note, made changes where appropriate and agree with it as written.

## 2024-09-19 NOTE — BH INPATIENT PSYCHIATRY PROGRESS NOTE - NSBHCHARTREVIEWVS_PSY_A_CORE FT
Vital Signs Last 24 Hrs  T(C): 36.3 (09-19-24 @ 08:27), Max: 36.3 (09-19-24 @ 08:27)  T(F): 97.3 (09-19-24 @ 08:27), Max: 97.3 (09-19-24 @ 08:27)  HR: --  BP: --  BP(mean): --  RR: 15 (09-19-24 @ 08:27) (15 - 15)  SpO2: --    Orthostatic VS  09-19-24 @ 08:27  Lying BP: --/-- HR: --  Sitting BP: 117/69 HR: 53  Standing BP: 106/76 HR: 75  Site: --  Mode: --  Orthostatic VS  09-18-24 @ 06:06  Lying BP: --/-- HR: --  Sitting BP: 118/76 HR: 57  Standing BP: 116/70 HR: 64  Site: --  Mode: --   Vital Signs Last 24 Hrs  T(C): 36.4 (09-20-24 @ 06:23), Max: 36.4 (09-20-24 @ 06:23)  T(F): 97.6 (09-20-24 @ 06:23), Max: 97.6 (09-20-24 @ 06:23)  HR: --  BP: --  BP(mean): --  RR: 19 (09-20-24 @ 06:23) (15 - 19)  SpO2: --    Orthostatic VS  09-20-24 @ 06:23  Lying BP: --/-- HR: --  Sitting BP: 109/71 HR: 74  Standing BP: 118/88 HR: 84  Site: --  Mode: --  Orthostatic VS  09-19-24 @ 08:27  Lying BP: --/-- HR: --  Sitting BP: 117/69 HR: 53  Standing BP: 106/76 HR: 75  Site: --  Mode: --

## 2024-09-19 NOTE — BH INPATIENT PSYCHIATRY PROGRESS NOTE - NSICDXBHSECONDARYDX_PSY_ALL_CORE
Current moderate episode of major depressive disorder without prior episode   F32.1  OCD (obsessive compulsive disorder)   F42.9  ADHD   F90.9  
Current moderate episode of major depressive disorder without prior episode   F32.1  OCD (obsessive compulsive disorder)   F42.9  ADHD   F90.9  Borderline personality disorder   F60.3  
Current moderate episode of major depressive disorder without prior episode   F32.1  OCD (obsessive compulsive disorder)   F42.9  ADHD   F90.9  
Current moderate episode of major depressive disorder without prior episode   F32.1  OCD (obsessive compulsive disorder)   F42.9  ADHD   F90.9  
Current moderate episode of major depressive disorder without prior episode   F32.1  OCD (obsessive compulsive disorder)   F42.9  ADHD   F90.9  Borderline personality disorder   F60.3  
Current moderate episode of major depressive disorder without prior episode   F32.1  OCD (obsessive compulsive disorder)   F42.9  ADHD   F90.9

## 2024-09-19 NOTE — BH PSYCHOLOGY - GROUP THERAPY NOTE - NSBHPSYCHOLRESPONSE_PSY_A_CORE
Coping skills acquired/Insight displayed/Accepted support
Coping skills acquired/Insight displayed/Accepted support
Insight displayed/Accepted support
Coping skills acquired/Insight displayed/Accepted support
Accepted support

## 2024-09-19 NOTE — BH INPATIENT PSYCHIATRY PROGRESS NOTE - NSBHMETABOLIC_PSY_ALL_CORE_FT
BMI: BMI (kg/m2): 27.4 (09-15-24 @ 08:53)  HbA1c:   Glucose:   BP: --Vital Signs Last 24 Hrs  T(C): 36.3 (09-19-24 @ 08:27), Max: 36.3 (09-19-24 @ 08:27)  T(F): 97.3 (09-19-24 @ 08:27), Max: 97.3 (09-19-24 @ 08:27)  HR: --  BP: --  BP(mean): --  RR: 15 (09-19-24 @ 08:27) (15 - 15)  SpO2: --    Orthostatic VS  09-19-24 @ 08:27  Lying BP: --/-- HR: --  Sitting BP: 117/69 HR: 53  Standing BP: 106/76 HR: 75  Site: --  Mode: --  Orthostatic VS  09-18-24 @ 06:06  Lying BP: --/-- HR: --  Sitting BP: 118/76 HR: 57  Standing BP: 116/70 HR: 64  Site: --  Mode: --    Lipid Panel: Date/Time: 09-14-24 @ 09:15  Cholesterol, Serum: 199  LDL Cholesterol Calculated: 145  HDL Cholesterol, Serum: 38  Total Cholesterol/HDL Ration Measurement: --  Triglycerides, Serum: 78   BMI: BMI (kg/m2): 27.4 (09-15-24 @ 08:53)  HbA1c:   Glucose:   BP: --Vital Signs Last 24 Hrs  T(C): 36.4 (09-20-24 @ 06:23), Max: 36.4 (09-20-24 @ 06:23)  T(F): 97.6 (09-20-24 @ 06:23), Max: 97.6 (09-20-24 @ 06:23)  HR: --  BP: --  BP(mean): --  RR: 19 (09-20-24 @ 06:23) (15 - 19)  SpO2: --    Orthostatic VS  09-20-24 @ 06:23  Lying BP: --/-- HR: --  Sitting BP: 109/71 HR: 74  Standing BP: 118/88 HR: 84  Site: --  Mode: --  Orthostatic VS  09-19-24 @ 08:27  Lying BP: --/-- HR: --  Sitting BP: 117/69 HR: 53  Standing BP: 106/76 HR: 75  Site: --  Mode: --    Lipid Panel: Date/Time: 09-14-24 @ 09:15  Cholesterol, Serum: 199  LDL Cholesterol Calculated: 145  HDL Cholesterol, Serum: 38  Total Cholesterol/HDL Ration Measurement: --  Triglycerides, Serum: 78
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
## 2024-09-19 NOTE — BH DISCHARGE NOTE NURSING/SOCIAL WORK/PSYCH REHAB - NSCDUDCCRISIS_PSY_A_CORE
[facility]  [phone] [facility]WELL ([phone])  Text "WELL" to [phone]  Website: [email or web address] 1 [phone] 621-HOPE (2081) Website: www.[facility].org/.  [facility] - [facility] – Crisis Care for Children, Adults and Families  [address] [place] [address]  Mobile Crisis Hotline – (997) 651-3864/.National Suicide Prevention Lifeline 6 (346) 418-7737/.  [facility]  1 (836) LIF[facility] (736-7272)/.  [facility]  (454) 916-9399/.  General acute hospital Behavioral Health Helpline / General acute hospital Mobile Crisis  [phone]) 788-ZOKX (0330)/.  [facility] Response Crisis Hotline  [phone]) 799-4077  24 hour telephone crisis intervention and suicide prevention hotline concerned with all mental health issues/.  [facility]  [address] [place] [address] [phone]   Hours:  Monday through Friday from 9 AM to 3 PM/988 Suicide and Crisis Lifeline

## 2024-09-19 NOTE — BH PSYCHOLOGY - GROUP THERAPY NOTE - NSPSYCHOLGRPBILLING_PSY_A_CORE
[id number] - Group Psychotherapy
[id number] - Group Psychotherapy
[id number] - Group Psychotherapy
19804 - Group Psychotherapy
[id number] - Group Psychotherapy

## 2024-09-19 NOTE — BH INPATIENT PSYCHIATRY PROGRESS NOTE - NSTXSUICIDGOALOTHER_PSY_ALL_CORE
Will express feelings and identify thoughts associated with suicidal ideation.

## 2024-09-19 NOTE — BH DISCHARGE NOTE NURSING/SOCIAL WORK/PSYCH REHAB - DISCHARGE INSTRUCTIONS AFTERCARE APPOINTMENTS
In order to check the location, date, or time of your aftercare appointment, please refer to your Discharge Instructions Document given to you upon leaving the hospital.  If you have lost the instructions please call [phone]

## 2024-09-19 NOTE — BH INPATIENT PSYCHIATRY PROGRESS NOTE - PRN MEDS
MEDICATIONS  (PRN):  hydrOXYzine hydrochloride 50 milliGRAM(s) Oral every 6 hours PRN Anxiety  LORazepam     Tablet 0.5 milliGRAM(s) Oral once PRN anxiety  LORazepam     Tablet 1 milliGRAM(s) Oral at bedtime PRN insomnia  LORazepam     Tablet 2 milliGRAM(s) Oral every 6 hours PRN agitation secondary to mood dysregulation  LORazepam   Injectable 2 milliGRAM(s) IntraMuscular once PRN severe agitation secondary to mood dysregulation  traZODone 50 milliGRAM(s) Oral at bedtime PRN insomnia   MEDICATIONS  (PRN):  hydrOXYzine hydrochloride 50 milliGRAM(s) Oral every 6 hours PRN Anxiety  LORazepam     Tablet 2 milliGRAM(s) Oral every 6 hours PRN agitation secondary to mood dysregulation  LORazepam     Tablet 0.5 milliGRAM(s) Oral once PRN anxiety  LORazepam     Tablet 1 milliGRAM(s) Oral at bedtime PRN insomnia  LORazepam   Injectable 2 milliGRAM(s) IntraMuscular once PRN severe agitation secondary to mood dysregulation  traZODone 50 milliGRAM(s) Oral at bedtime PRN insomnia

## 2024-09-19 NOTE — BH INPATIENT PSYCHIATRY PROGRESS NOTE - NSBHATTESTBILLING_PSY_A_CORE
69010-Skfzvzehry OBS or IP - moderate complexity OR 35-49 mins
47748-Wayotnaybn OBS or IP - moderate complexity OR 35-49 mins
27841-Fxutjskkxs OBS or IP - moderate complexity OR 35-49 mins
34547-Nddgdbwkjr OBS or IP - moderate complexity OR 35-49 mins
20025-Wiuiwwmaih OBS or IP - moderate complexity OR 35-49 mins
12958-Toexlxpgdx OBS or IP - moderate complexity OR 35-49 mins

## 2024-09-19 NOTE — BH INPATIENT PSYCHIATRY PROGRESS NOTE - NSDCCRITERIA_PSY_ALL_CORE
resolution of suicidal ideation and anxious thoughts

## 2024-09-19 NOTE — BH INPATIENT PSYCHIATRY PROGRESS NOTE - NSICDXBHPRIMARYDX_PSY_ALL_CORE
Borderline personality disorder   F60.3  
Borderline personality disorder   F60.3  
Atypical autism   F84.9  
Borderline personality disorder   F60.3  
Borderline personality disorder   F60.3  
Atypical autism   F84.9

## 2024-09-19 NOTE — BH PSYCHOLOGY - GROUP THERAPY NOTE - TOKEN PULL-DIAGNOSIS
Primary Diagnosis:  Atypical autism [F84.9]        Problem Dx:   Borderline personality disorder [F60.3]      ADHD [F90.9]      OCD (obsessive compulsive disorder) [F42.9]      Current moderate episode of major depressive disorder without prior episode [F32.1]      
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      
Primary Diagnosis:    Problem Dx:   Current moderate episode of major depressive disorder without prior episode [F32.1]      
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

## 2024-09-19 NOTE — BH INPATIENT PSYCHIATRY PROGRESS NOTE - NSBHFUPINTERVALHXFT_PSY_A_CORE
Chart reviewed and case discussed with nursing staff. No events overnight, slept through the night. Euthymic and talkative on interview. Endorses continued intrusive thoughts, but says they are no longer as emotionally troubling. Asked about discharge planning. Spoke about goals he has once he's discharged - writing a novel, getting back to work, working on his relationship with his family and girlfriend, reconnecting with his therapist and out-patient psychiatrist, potentially stopping Straterra medication, and decreasing how often he smokes pot. Feels that medications are helping him. Denies hopelessness and SI, HI, AVH.  Chart reviewed and case discussed with nursing staff. No events overnight, slept through the night. Euthymic and talkative on interview. Endorses continued intrusive thoughts, but says they are no longer as emotionally troubling. Asked about discharge planning. Spoke about goals he has once he's discharged - writing a novel, getting back to work, working on his relationship with his family and girlfriend, reconnecting with his therapist and out-patient psychiatrist, potentially stopping Straterra medication, and decreasing how often he smokes pot. Feels that medications are helping him. Denies hopelessness and SI, HI, AVH.     Per mother (Kate Hunt, [phone]) - She has no safety concerns for the patient. He has seemed to be more like himself when she has come to visit him. She is hopefuly about reconnecting him with his therapist and out-patient psychiatrist once he's discharged. She would like to attend some therapy sessions with the patient, do family therapy, open up their lines of communication, and get more engaged in his care. Chart reviewed and case discussed with nursing staff. No events overnight, slept through the night. Euthymic and talkative on interview. Endorses continued intrusive thoughts/images, but says they are no longer as emotionally troubling. Asked about discharge planning. Spoke about goals he has once he's discharged - writing a novel, getting back to work, working on his relationship with his family and girlfriend, reconnecting with his therapist and out-patient psychiatrist, potentially stopping Straterra medication, and decreasing how often he smokes pot. Feels that medications are helping him. Denies hopelessness and SI, HI, AVH.     Per mother (Kate Hunt, [phone]) - She has no safety concerns for the patient. He has seemed to be more like himself when she has come to visit him. She is hopefuly about reconnecting him with his therapist and out-patient psychiatrist once he's discharged. She would like to attend some therapy sessions with the patient, do family therapy, open up their lines of communication, and get more engaged in his care.

## 2024-09-19 NOTE — BH INPATIENT PSYCHIATRY PROGRESS NOTE - MSE UNSTRUCTURED FT
On exam patient is alert, cooperative   speech: WNL volume and rate  Mood: depressed, anxious  Affect type: congruent  Affect Range: constricted  Thought process: rumination, normal thought associations, over-inclusive  Thought Content: hopelessness, guilt but no evidence of delusions  Perceptual: Denies perceptual disturbance  Insight: fair  Judgement: poor   Impulse Control: normal 
On exam patient is alert, cooperative, AAOx3, no abnormal movements  Speech: WNL volume and rate  Mood: euthymic, normal  Affect type: congruent  Affect Range: full  Thought process: rumination, normal thought associations, linear  Thought Content: occasional intrusive thoughts, future- and treatment-oriented  Perceptual: Denies perceptual disturbance  Insight: fair  Judgement: fair  Impulse Control: normal 
On exam patient is alert, cooperative   speech: WNL volume and rate  Mood: depressed, anxious  Affect type: congruent  Affect Range: constricted  Thought process: rumination, normal thought associations, over-inclusive  Thought Content: hopelessness, guilt but no evidence of delusions  Perceptual: Denies perceptual disturbance  Insight: fair  Judgement: poor   Impulse Control: normal 
On exam patient is alert, cooperative   speech: WNL volume and rate  Mood: depressed, anxious  Affect type: congruent  Affect Range: constricted  Thought process: rumination, normal thought associations, over-inclusive  Thought Content: hopelessness, guilt but no evidence of delusions  Perceptual: Denies perceptual disturbance  Insight: fair  Judgement: poor   Impulse Control: normal 
On exam patient is alert, cooperative, AAOx3, no abnormal movements  Speech: WNL volume and rate  Mood: euthymic, normal  Affect type: congruent  Affect Range: full  Thought process: normal thought associations, linear  Thought Content: occasional intrusive thoughts, future- and treatment-oriented  Perceptual: Denies perceptual disturbance  Insight: fair  Judgement: fair  Impulse Control: normal 
On exam patient is alert, cooperative, AAOx3, no abnormal movements  Speech: WNL volume and rate  Mood: euthymic, normal  Affect type: congruent  Affect Range: full  Thought process: normal thought associations, linear  Thought Content: occasional intrusive thoughts, future- and treatment-oriented  Perceptual: Denies perceptual disturbance  Insight: fair  Judgement: fair  Impulse Control: normal

## 2024-09-19 NOTE — BH DISCHARGE NOTE NURSING/SOCIAL WORK/PSYCH REHAB - PATIENT PORTAL LINK FT
You can access the FollowMyHealth Patient Portal offered by [facility] by registering at the following website: [email or web address]. By joining [employer]’s FollowMyHealth portal, you will also be able to view your health information using other applications (apps) compatible with our system.

## 2024-09-19 NOTE — BH DISCHARGE NOTE NURSING/SOCIAL WORK/PSYCH REHAB - NSDCPRGOAL_PSY_ALL_CORE
Patient met specified goal of expressing feelings and identifying thoughts associated with suicidal ideation. Progress was evidenced by patient's ability to identify certain triggers, such as certain family dynamics. Patient attended many groups and was appropriate with peers. Patient was visible on the milieu. Patient completed a safety plan upon discharge.

## 2024-09-19 NOTE — BH INPATIENT PSYCHIATRY PROGRESS NOTE - CURRENT MEDICATION
MEDICATIONS  (STANDING):  escitalopram 20 milliGRAM(s) Oral at bedtime    MEDICATIONS  (PRN):  hydrOXYzine hydrochloride 50 milliGRAM(s) Oral every 6 hours PRN Anxiety  LORazepam     Tablet 0.5 milliGRAM(s) Oral once PRN anxiety  LORazepam     Tablet 1 milliGRAM(s) Oral at bedtime PRN insomnia  LORazepam     Tablet 2 milliGRAM(s) Oral every 6 hours PRN agitation secondary to mood dysregulation  LORazepam   Injectable 2 milliGRAM(s) IntraMuscular once PRN severe agitation secondary to mood dysregulation  traZODone 50 milliGRAM(s) Oral at bedtime PRN insomnia   MEDICATIONS  (STANDING):  escitalopram 20 milliGRAM(s) Oral at bedtime    MEDICATIONS  (PRN):  hydrOXYzine hydrochloride 50 milliGRAM(s) Oral every 6 hours PRN Anxiety  LORazepam     Tablet 2 milliGRAM(s) Oral every 6 hours PRN agitation secondary to mood dysregulation  LORazepam     Tablet 0.5 milliGRAM(s) Oral once PRN anxiety  LORazepam     Tablet 1 milliGRAM(s) Oral at bedtime PRN insomnia  LORazepam   Injectable 2 milliGRAM(s) IntraMuscular once PRN severe agitation secondary to mood dysregulation  traZODone 50 milliGRAM(s) Oral at bedtime PRN insomnia

## 2024-09-20 VITALS — RESPIRATION RATE: 19 BRPM | TEMPERATURE: 98 F

## 2024-09-20 RX ADMIN — Medication 50 MILLIGRAM(S): at 08:33

## 2024-09-20 NOTE — BH INPATIENT PSYCHIATRY DISCHARGE NOTE - OTHER PAST PSYCHIATRIC HISTORY (INCLUDE DETAILS REGARDING ONSET, COURSE OF ILLNESS, INPATIENT/OUTPATIENT TREATMENT)
Psychiatric history of attention deficit hyperactivity disorder (on Straterra) OCD,  unspecified mood disorder on lexapro, with one prior suicide attempt (2014), a history of self-cutting, two prior inpatient psychiatric hospitalizations (10/2014 at [facility] and 11/2014 at [facility]), and 30 day treatment program at [facility], engaged in outpatient psychiatric treatment with Dr. Matthew, also with private psychologist/counseling Dr. Rocha  3 past psych hospitalization  1 past SA by hanging in 2016
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
## 2024-09-20 NOTE — BH INPATIENT PSYCHIATRY DISCHARGE NOTE - NSBHFUPINTERVALHXFT_PSY_A_CORE
Chart reviewed. Case discussed with team. On interview, patient is euthymic and cooperative. Endorses improved mood and excitement for discharge to be with family and girlfriend, his dog, get back to job. Says he has employed DBT skills to regulate his emotions when he is feeling stressed or dysregulated. Says he believes that this hospitalization was necessary for him and is grateful for his current medical regimen. Asked for a work letter.  Chart reviewed. Case discussed with team. On interview, patient is euthymic and cooperative. Endorses improved mood and excitement for discharge to be with family and girlfriend, his dog, get back to job. Says he has employed DBT skills to regulate his emotions when he is feeling stressed or dysregulated. Says he believes that this hospitalization was necessary for him and is grateful for his current medical regimen. Asked for a work letter. Engages well in safety planning. Reviewed meds and pt stated intent to remain abstinent from cannabis, engage with therapy.

## 2024-09-20 NOTE — BH INPATIENT PSYCHIATRY DISCHARGE NOTE - NSBHMETABOLIC_PSY_ALL_CORE_FT
BMI: BMI (kg/m2): 27.4 (09-15-24 @ 08:53)  HbA1c:   Glucose:   BP: --Vital Signs Last 24 Hrs  T(C): 36.4 (09-20-24 @ 06:23), Max: 36.4 (09-20-24 @ 06:23)  T(F): 97.6 (09-20-24 @ 06:23), Max: 97.6 (09-20-24 @ 06:23)  HR: --  BP: --  BP(mean): --  RR: 19 (09-20-24 @ 06:23) (19 - 19)  SpO2: --    Orthostatic VS  09-20-24 @ 06:23  Lying BP: --/-- HR: --  Sitting BP: 109/71 HR: 74  Standing BP: 118/88 HR: 84  Site: --  Mode: --  Orthostatic VS  09-19-24 @ 08:27  Lying BP: --/-- HR: --  Sitting BP: 117/69 HR: 53  Standing BP: 106/76 HR: 75  Site: --  Mode: --    Lipid Panel: Date/Time: 09-14-24 @ 09:15  Cholesterol, Serum: 199  LDL Cholesterol Calculated: 145  HDL Cholesterol, Serum: 38  Total Cholesterol/HDL Ration Measurement: --  Triglycerides, Serum: 78

## 2024-09-20 NOTE — BH PSYCHOLOGY - CLINICIAN PSYCHOTHERAPY NOTE - NSBHPSYCHOLPROBS_PSY_ALL_CORE
Anxiety/Depression/Family Dysfunction/Self Injurious Behavior/Suicidality
Anxiety/Depression/Family Dysfunction/Impulsivity/Self Injurious Behavior/Suicidality
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
## 2024-09-20 NOTE — BH INPATIENT PSYCHIATRY DISCHARGE NOTE - NSBHFUPINTERVALCCFT_PSY_A_CORE
"i "I'm great!" "I'm great!"  Pt seen on day of dc for follow up for anxiety   Discharge Progress Note Date and Time: 09-20-24 @ 08:41

## 2024-09-20 NOTE — BH INPATIENT PSYCHIATRY DISCHARGE NOTE - NSBHDCHANDOFFFT_PSY_ALL_CORE
Handoff including circumstance surrounding admission, past hx, hospital course, medications, and after care recs provided to XXXX, if questions regarding hospital course or patient come up following dc, please contact [facility] team at [facility] at [phone].

## 2024-09-20 NOTE — BH PSYCHOLOGY - CLINICIAN PSYCHOTHERAPY NOTE - NSTXDCFAMDATETRGT_PSY_ALL_CORE
"Subjective   Trinidad Quinteros is a 54 y.o. female  Sinus Problem (Sinus congestion and pain with nausea, taking zofran )      History of Present Illness  The patient is a 54-year-old female seen today to discuss sinus congestion and sinus pressure with nausea.    The patient reports that she started feeling sick on 04/09/2023, and it has gotten worse. She confirms she finished the antibiotic for her sinuses approximately 1 week ago. The patient reports that her symptoms had improved but then later returned. Dr. Schroeder administered an injection for her on 04/04/2023. She notes that Dr. Schroeder did not tell her he would write her gabapentin prescriptions for her. She has taken Augmentin in the past, and she confirms that she can tolerate Augmentin.     She states that she went home and went to bed yesterday, 04/10/2023, and she was achy and tired. She was not tested for influenza or COVID-19. She has a headache and pressure over her forehead. The patient reports that she is blowing her nose with the production of green phlegm. She relays she is nauseated from postnasal drip. She denies fever. She mentions her blood pressure has been slightly elevated.     She continues to smoke the same amount. She tried using her inhaler, but it made her cough. She confirms her inhaler was unhelpful. She states that she had to quit using nicotine patches because \"she felt like her whole body was full of nicotine.\" The patient reports that she is still using her Advair inhaler and uses it more than her albuterol inhaler. She has been wheezing more at night when she lays down and wears her CPAP during sleep. She denies having a nebulizer at home. She has a \"dental thing\" scheduled for next month.     She confirms she is allergic to SULFA medications.     The following portions of the patient's history were reviewed and updated as appropriate: allergies, current medications, past social history and problem list    Review of Systems "
  Constitutional: Negative for chills, fatigue and fever.   HENT: Positive for congestion, ear pain, postnasal drip, rhinorrhea, sinus pressure and sinus pain. Negative for sore throat.    Eyes: Positive for pain.   Respiratory: Positive for cough, shortness of breath and wheezing.    Neurological: Positive for headaches. Negative for dizziness.   Hematological: Negative for adenopathy.   Psychiatric/Behavioral: Positive for sleep disturbance.       Objective      Chest x-ray: Stable chronic interstitial changes.    Vitals:    04/11/23 1118   BP: 132/90   Pulse: 85   Temp: 97.3 °F (36.3 °C)   SpO2: 96%       Physical Exam  Vitals and nursing note reviewed.   Constitutional:       General: She is not in acute distress.     Appearance: Normal appearance. She is well-developed. She is not ill-appearing, toxic-appearing or diaphoretic.   HENT:      Head: Normocephalic and atraumatic.      Right Ear: Tympanic membrane and ear canal normal.      Left Ear: Tympanic membrane and ear canal normal.      Nose: Mucosal edema and congestion present. No rhinorrhea.      Right Sinus: Maxillary sinus tenderness and frontal sinus tenderness present.      Left Sinus: Maxillary sinus tenderness and frontal sinus tenderness present.      Mouth/Throat:      Pharynx: No oropharyngeal exudate.   Eyes:      Pupils: Pupils are equal, round, and reactive to light.   Cardiovascular:      Rate and Rhythm: Normal rate and regular rhythm.   Pulmonary:      Effort: Pulmonary effort is normal.      Breath sounds: Wheezing present.   Neurological:      Mental Status: She is alert.         Assessment & Plan     Diagnoses and all orders for this visit:    1. COPD with exacerbation (Primary)    2. Acute recurrent frontal sinusitis    3. ADRIAN (obstructive sleep apnea)    4. Tobacco use disorder    Other orders  -     albuterol (ACCUNEB) 1.25 MG/3ML nebulizer solution; Take 3 mL by nebulization Every 6 (Six) Hours As Needed for Wheezing or Shortness of 
Air.  Dispense: 100 mL; Refill: 12  -     amoxicillin-clavulanate (Augmentin) 875-125 MG per tablet; Take 1 tablet by mouth 2 (Two) Times a Day.  Dispense: 20 tablet; Refill: 0    1. COPD  - Patient is encouraged to continue using her inhalers.   - I will prescribe Augmentin and Mucinex.  - I will prescribe the patient a nebulizer.    2. Health maintenance  - Patient is instructed to call Dr. Schroeder' office regarding her oxycodone, Flexeril, and gabapentin.    Transcribed from ambient dictation for Josephine Delacruz PA-C by Azalea Bautista.  04/11/23   14:50 EDT    Patient or patient representative verbalized consent to the visit recording.  I have personally performed the services described in this document as transcribed by the above individual, and it is both accurate and complete.  Josephine Delacruz PA-C  4/12/2023  09:52 EDT    
18-Sep-2024
25-Sep-2024

## 2024-09-20 NOTE — BH PSYCHOLOGY - CLINICIAN PSYCHOTHERAPY NOTE - NSTXSUICIDGOALOTHER_PSY_ALL_CORE
Will express feelings and identify thoughts associated with suicidal ideation.
Will express feelings and identify thoughts associated with suicidal ideation.

## 2024-09-20 NOTE — BH PSYCHOLOGY - CLINICIAN PSYCHOTHERAPY NOTE - TOKEN PULL-DIAGNOSIS
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

## 2024-09-20 NOTE — BH INPATIENT PSYCHIATRY DISCHARGE NOTE - NSDCCPCAREPLAN_GEN_ALL_CORE_FT
PRINCIPAL DISCHARGE DIAGNOSIS  Diagnosis: Current moderate episode of major depressive disorder without prior episode  Assessment and Plan of Treatment:      PRINCIPAL DISCHARGE DIAGNOSIS  Diagnosis: Borderline personality disorder  Assessment and Plan of Treatment:

## 2024-09-20 NOTE — BH INPATIENT PSYCHIATRY DISCHARGE NOTE - DESCRIPTION
recently graduated from [employer] with a psychology degree, works as a behavioral specialist
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
## 2024-09-20 NOTE — BH INPATIENT PSYCHIATRY DISCHARGE NOTE - NSDCMRMEDTOKEN_GEN_ALL_CORE_FT
escitalopram 20 mg oral tablet: 1 tab(s) orally once a day (at bedtime)  hydrOXYzine hydrochloride 50 mg oral tablet: 1 tab(s) orally once a day as needed for Anxiety  traZODone 50 mg oral tablet: 1 tab(s) orally once a day (at bedtime) as needed for insomnia
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
## 2024-09-20 NOTE — BH INPATIENT PSYCHIATRY DISCHARGE NOTE - MSE UNSTRUCTURED FT
Appearance: alert, good grooming and hygiene, dressed in personal clothes  Attitude/Behavior: calm, cooperative, AAOx3  Psychomotor: no abnormal movements  Gait: normal  Quality of speech: WNL volume and rate  Mood: euthymic, normal  Affect type: congruent  Affect Range: full  Thought process: normal thought associations, linear   Thought Content: occasional intrusive thoughts, future- and treatment-oriented  Perceptual: denies AVH, perceptual disturbances  Insight: fair  Judgement: fair   Impulse Control: normal

## 2024-10-02 ENCOUNTER — APPOINTMENT (OUTPATIENT)
Dept: PSYCHIATRY | Facility: CLINIC | Age: 24
End: 2024-10-02

## 2024-10-04 ENCOUNTER — APPOINTMENT (OUTPATIENT)
Dept: PSYCHIATRY | Facility: CLINIC | Age: 24
End: 2024-10-04
Payer: COMMERCIAL

## 2024-10-04 PROCEDURE — 99214 OFFICE O/P EST MOD 30 MIN: CPT

## 2024-10-04 RX ORDER — ESCITALOPRAM OXALATE 10 MG/1
10 TABLET ORAL
Qty: 30 | Refills: 1 | Status: ACTIVE | COMMUNITY
Start: 2024-10-04 | End: 1900-01-01

## 2024-10-04 RX ORDER — HYDROXYZINE HYDROCHLORIDE 50 MG/1
50 TABLET ORAL AT BEDTIME
Qty: 90 | Refills: 0 | Status: ACTIVE | COMMUNITY
Start: 2024-10-04 | End: 1900-01-01

## 2024-10-04 NOTE — PHYSICAL EXAM
[None] : none [Cooperative] : cooperative [Anxious] : anxious [Full] : full [Clear] : clear [Linear/Goal Directed] : linear/goal directed [WNL] : within normal limits [Average] : average [de-identified] : fair [de-identified] : fair
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
## 2024-10-04 NOTE — PLAN
[FreeTextEntry4] : Assessment: Patient is a 23 yo male with h/o anxiety and ADHD, seen today for medication management. Patient is compliant with the medications, tolerating it well without any side effects. I-STOP was checked without any problems.  PLAN: DECREASE MARIJUANA USE Increase Lexapro 20 to 30 mg PO QD for anxiety Continue Atarax 50 mg PO QHS for insomnia and anxiety D/C Strattera 40 mg - Discussed risks and benefits of medications including side effects of GI and sexual with SSRI. Alternative strategies including no intervention discussed with patient. Patient consents to current medications as prescribed. - Discussed with patient regarding importance of abstinence and sobriety from alcohol and drugs. Educated about relationship between worsening mood/anxiety symptoms and drug use and improvement of symptoms with abstinence.  - Discussed about unpredictable effects including cardiorespiratory collapse from the combination of illicit drugs and prescribed medications. Patient verbalized understanding. - Patient understands to contact clinic prn with concerns and agrees to call 911 or go to nearest ER if symptoms worsen. - Next appointment was made by the patient in 6-8 weeks Patient was not in any distress.

## 2024-11-13 ENCOUNTER — APPOINTMENT (OUTPATIENT)
Dept: PSYCHIATRY | Facility: CLINIC | Age: 24
End: 2024-11-13
Payer: COMMERCIAL

## 2024-11-13 PROCEDURE — 99214 OFFICE O/P EST MOD 30 MIN: CPT

## 2024-11-28 ENCOUNTER — NON-APPOINTMENT (OUTPATIENT)
Age: 24
End: 2024-11-28

## 2024-12-23 ENCOUNTER — APPOINTMENT (OUTPATIENT)
Dept: PSYCHIATRY | Facility: CLINIC | Age: 24
End: 2024-12-23
Payer: COMMERCIAL

## 2024-12-23 PROCEDURE — 99214 OFFICE O/P EST MOD 30 MIN: CPT

## 2024-12-23 RX ORDER — BUPROPION HYDROCHLORIDE 75 MG/1
75 TABLET, FILM COATED ORAL
Qty: 30 | Refills: 1 | Status: ACTIVE | COMMUNITY
Start: 2024-12-23 | End: 1900-01-01

## 2025-01-28 ENCOUNTER — APPOINTMENT (OUTPATIENT)
Dept: PSYCHIATRY | Facility: CLINIC | Age: 25
End: 2025-01-28
Payer: COMMERCIAL

## 2025-01-28 PROCEDURE — 99214 OFFICE O/P EST MOD 30 MIN: CPT | Mod: 95

## 2025-01-28 RX ORDER — BUPROPION HYDROCHLORIDE 150 MG/1
150 TABLET, EXTENDED RELEASE ORAL
Qty: 30 | Refills: 1 | Status: ACTIVE | COMMUNITY
Start: 2025-01-28 | End: 1900-01-01

## 2025-02-14 ENCOUNTER — APPOINTMENT (OUTPATIENT)
Dept: PSYCHIATRY | Facility: CLINIC | Age: 25
End: 2025-02-14

## 2025-02-24 ENCOUNTER — APPOINTMENT (OUTPATIENT)
Dept: PSYCHIATRY | Facility: CLINIC | Age: 25
End: 2025-02-24

## 2025-07-28 ENCOUNTER — INPATIENT (INPATIENT)
Facility: HOSPITAL | Age: 25
LOS: 10 days | Discharge: HOME | End: 2025-08-08
Attending: STUDENT IN AN ORGANIZED HEALTH CARE EDUCATION/TRAINING PROGRAM | Admitting: STUDENT IN AN ORGANIZED HEALTH CARE EDUCATION/TRAINING PROGRAM
Payer: COMMERCIAL

## 2025-07-28 VITALS
HEIGHT: 76 IN | DIASTOLIC BLOOD PRESSURE: 92 MMHG | HEART RATE: 62 BPM | WEIGHT: 265 LBS | TEMPERATURE: 98 F | RESPIRATION RATE: 18 BRPM | SYSTOLIC BLOOD PRESSURE: 139 MMHG | OXYGEN SATURATION: 98 %

## 2025-07-28 LAB
ALBUMIN SERPL ELPH-MCNC: 4.7 G/DL — SIGNIFICANT CHANGE UP (ref 3.3–5)
ALP SERPL-CCNC: 56 U/L — SIGNIFICANT CHANGE UP (ref 40–120)
ALT FLD-CCNC: 52 U/L — HIGH (ref 4–41)
AMPHET UR-MCNC: NEGATIVE — SIGNIFICANT CHANGE UP
ANION GAP SERPL CALC-SCNC: 12 MMOL/L — SIGNIFICANT CHANGE UP (ref 7–14)
APAP SERPL-MCNC: <10 UG/ML — LOW (ref 15–25)
APPEARANCE UR: CLEAR — SIGNIFICANT CHANGE UP
AST SERPL-CCNC: 28 U/L — SIGNIFICANT CHANGE UP (ref 4–40)
BACTERIA # UR AUTO: NEGATIVE /HPF — SIGNIFICANT CHANGE UP
BARBITURATES UR SCN-MCNC: NEGATIVE — SIGNIFICANT CHANGE UP
BASOPHILS # BLD AUTO: 0.05 K/UL — SIGNIFICANT CHANGE UP (ref 0–0.2)
BASOPHILS NFR BLD AUTO: 0.7 % — SIGNIFICANT CHANGE UP (ref 0–2)
BENZODIAZ UR-MCNC: NEGATIVE — SIGNIFICANT CHANGE UP
BILIRUB SERPL-MCNC: 0.3 MG/DL — SIGNIFICANT CHANGE UP (ref 0.2–1.2)
BILIRUB UR-MCNC: NEGATIVE — SIGNIFICANT CHANGE UP
BUN SERPL-MCNC: 14 MG/DL — SIGNIFICANT CHANGE UP (ref 7–23)
CALCIUM SERPL-MCNC: 9.3 MG/DL — SIGNIFICANT CHANGE UP (ref 8.4–10.5)
CAST: 0 /LPF — SIGNIFICANT CHANGE UP (ref 0–4)
CHLORIDE SERPL-SCNC: 104 MMOL/L — SIGNIFICANT CHANGE UP (ref 98–107)
CO2 SERPL-SCNC: 23 MMOL/L — SIGNIFICANT CHANGE UP (ref 22–31)
COCAINE METAB.OTHER UR-MCNC: NEGATIVE — SIGNIFICANT CHANGE UP
COLOR SPEC: YELLOW — SIGNIFICANT CHANGE UP
CREAT SERPL-MCNC: 0.98 MG/DL — SIGNIFICANT CHANGE UP (ref 0.5–1.3)
CREATININE URINE RESULT, DAU: 131 MG/DL — SIGNIFICANT CHANGE UP
DIFF PNL FLD: NEGATIVE — SIGNIFICANT CHANGE UP
EGFR: 110 ML/MIN/1.73M2 — SIGNIFICANT CHANGE UP
EGFR: 110 ML/MIN/1.73M2 — SIGNIFICANT CHANGE UP
EOSINOPHIL # BLD AUTO: 0.11 K/UL — SIGNIFICANT CHANGE UP (ref 0–0.5)
EOSINOPHIL NFR BLD AUTO: 1.6 % — SIGNIFICANT CHANGE UP (ref 0–6)
ETHANOL SERPL-MCNC: <10 MG/DL — SIGNIFICANT CHANGE UP
FENTANYL UR QL SCN: NEGATIVE — SIGNIFICANT CHANGE UP
GLUCOSE SERPL-MCNC: 83 MG/DL — SIGNIFICANT CHANGE UP (ref 70–99)
GLUCOSE UR QL: NEGATIVE MG/DL — SIGNIFICANT CHANGE UP
HCT VFR BLD CALC: 48.8 % — SIGNIFICANT CHANGE UP (ref 39–50)
HGB BLD-MCNC: 16.3 G/DL — SIGNIFICANT CHANGE UP (ref 13–17)
IMM GRANULOCYTES # BLD AUTO: 0.03 K/UL — SIGNIFICANT CHANGE UP (ref 0–0.07)
IMM GRANULOCYTES NFR BLD AUTO: 0.4 % — SIGNIFICANT CHANGE UP (ref 0–0.9)
KETONES UR QL: NEGATIVE MG/DL — SIGNIFICANT CHANGE UP
LEUKOCYTE ESTERASE UR-ACNC: ABNORMAL
LYMPHOCYTES # BLD AUTO: 2.19 K/UL — SIGNIFICANT CHANGE UP (ref 1–3.3)
LYMPHOCYTES NFR BLD AUTO: 32.3 % — SIGNIFICANT CHANGE UP (ref 13–44)
MCHC RBC-ENTMCNC: 30.1 PG — SIGNIFICANT CHANGE UP (ref 27–34)
MCHC RBC-ENTMCNC: 33.4 G/DL — SIGNIFICANT CHANGE UP (ref 32–36)
MCV RBC AUTO: 90.2 FL — SIGNIFICANT CHANGE UP (ref 80–100)
METHADONE UR-MCNC: NEGATIVE — SIGNIFICANT CHANGE UP
MONOCYTES # BLD AUTO: 0.53 K/UL — SIGNIFICANT CHANGE UP (ref 0–0.9)
MONOCYTES NFR BLD AUTO: 7.8 % — SIGNIFICANT CHANGE UP (ref 2–14)
NEUTROPHILS # BLD AUTO: 3.88 K/UL — SIGNIFICANT CHANGE UP (ref 1.8–7.4)
NEUTROPHILS NFR BLD AUTO: 57.2 % — SIGNIFICANT CHANGE UP (ref 43–77)
NITRITE UR-MCNC: NEGATIVE — SIGNIFICANT CHANGE UP
NRBC # BLD AUTO: 0 K/UL — SIGNIFICANT CHANGE UP (ref 0–0)
NRBC # FLD: 0 K/UL — SIGNIFICANT CHANGE UP (ref 0–0)
NRBC BLD AUTO-RTO: 0 /100 WBCS — SIGNIFICANT CHANGE UP (ref 0–0)
OPIATES UR-MCNC: NEGATIVE — SIGNIFICANT CHANGE UP
OXYCODONE UR-MCNC: NEGATIVE — SIGNIFICANT CHANGE UP
PCP SPEC-MCNC: SIGNIFICANT CHANGE UP
PCP UR-MCNC: NEGATIVE — SIGNIFICANT CHANGE UP
PH UR: 6 — SIGNIFICANT CHANGE UP (ref 5–8)
PLATELET # BLD AUTO: 264 K/UL — SIGNIFICANT CHANGE UP (ref 150–400)
PMV BLD: 11.3 FL — SIGNIFICANT CHANGE UP (ref 7–13)
POTASSIUM SERPL-MCNC: 3.9 MMOL/L — SIGNIFICANT CHANGE UP (ref 3.5–5.3)
POTASSIUM SERPL-SCNC: 3.9 MMOL/L — SIGNIFICANT CHANGE UP (ref 3.5–5.3)
PROT SERPL-MCNC: 7.8 G/DL — SIGNIFICANT CHANGE UP (ref 6–8.3)
PROT UR-MCNC: SIGNIFICANT CHANGE UP MG/DL
RBC # BLD: 5.41 M/UL — SIGNIFICANT CHANGE UP (ref 4.2–5.8)
RBC # FLD: 13.1 % — SIGNIFICANT CHANGE UP (ref 10.3–14.5)
RBC CASTS # UR COMP ASSIST: 0 /HPF — SIGNIFICANT CHANGE UP (ref 0–4)
SALICYLATES SERPL-MCNC: <0.3 MG/DL — LOW (ref 15–30)
SODIUM SERPL-SCNC: 139 MMOL/L — SIGNIFICANT CHANGE UP (ref 135–145)
SP GR SPEC: 1.02 — SIGNIFICANT CHANGE UP (ref 1–1.03)
SQUAMOUS # UR AUTO: 0 /HPF — SIGNIFICANT CHANGE UP (ref 0–5)
THC UR QL: POSITIVE
TOXICOLOGY SCREEN, DRUGS OF ABUSE, SERUM RESULT: SIGNIFICANT CHANGE UP
TSH SERPL-MCNC: 2.25 UIU/ML — SIGNIFICANT CHANGE UP (ref 0.27–4.2)
UROBILINOGEN FLD QL: 0.2 MG/DL — SIGNIFICANT CHANGE UP (ref 0.2–1)
WBC # BLD: 6.79 K/UL — SIGNIFICANT CHANGE UP (ref 3.8–10.5)
WBC # FLD AUTO: 6.79 K/UL — SIGNIFICANT CHANGE UP (ref 3.8–10.5)
WBC UR QL: 3 /HPF — SIGNIFICANT CHANGE UP (ref 0–5)

## 2025-07-28 PROCEDURE — 99285 EMERGENCY DEPT VISIT HI MDM: CPT

## 2025-07-28 PROCEDURE — 99238 HOSP IP/OBS DSCHRG MGMT 30/<: CPT

## 2025-07-28 RX ORDER — OLANZAPINE 10 MG/1
5 TABLET ORAL EVERY 6 HOURS
Refills: 0 | Status: DISCONTINUED | OUTPATIENT
Start: 2025-07-28 | End: 2025-08-08

## 2025-07-28 RX ORDER — ESCITALOPRAM OXALATE 20 MG/1
30 TABLET ORAL DAILY
Refills: 0 | Status: DISCONTINUED | OUTPATIENT
Start: 2025-07-28 | End: 2025-08-08

## 2025-07-28 RX ORDER — BUPROPION HYDROBROMIDE 522 MG/1
300 TABLET, EXTENDED RELEASE ORAL DAILY
Refills: 0 | Status: DISCONTINUED | OUTPATIENT
Start: 2025-07-28 | End: 2025-08-08

## 2025-07-28 RX ORDER — OLANZAPINE 10 MG/1
5 TABLET ORAL ONCE
Refills: 0 | Status: DISCONTINUED | OUTPATIENT
Start: 2025-07-28 | End: 2025-08-08

## 2025-07-28 RX ORDER — LORAZEPAM 4 MG/ML
2 VIAL (ML) INJECTION EVERY 6 HOURS
Refills: 0 | Status: DISCONTINUED | OUTPATIENT
Start: 2025-07-28 | End: 2025-08-04

## 2025-07-28 RX ORDER — HYDROXYZINE HYDROCHLORIDE 25 MG/1
50 TABLET, FILM COATED ORAL EVERY 6 HOURS
Refills: 0 | Status: DISCONTINUED | OUTPATIENT
Start: 2025-07-28 | End: 2025-08-08

## 2025-07-28 RX ORDER — ARIPIPRAZOLE 2 MG/1
10 TABLET ORAL DAILY
Refills: 0 | Status: DISCONTINUED | OUTPATIENT
Start: 2025-07-28 | End: 2025-08-08

## 2025-07-28 RX ADMIN — HYDROXYZINE HYDROCHLORIDE 50 MILLIGRAM(S): 25 TABLET, FILM COATED ORAL at 14:11

## 2025-07-29 LAB
A1C WITH ESTIMATED AVERAGE GLUCOSE RESULT: 5.1 % — SIGNIFICANT CHANGE UP (ref 4–5.6)
CHOLEST SERPL-MCNC: 200 MG/DL — HIGH
ESTIMATED AVERAGE GLUCOSE: 100 — SIGNIFICANT CHANGE UP
HDLC SERPL-MCNC: 33 MG/DL — LOW
LDLC SERPL-MCNC: 138 MG/DL — HIGH
LIPID PNL WITH DIRECT LDL SERPL: 138 MG/DL — HIGH
NONHDLC SERPL-MCNC: 167 MG/DL — HIGH
TRIGL SERPL-MCNC: 158 MG/DL — HIGH

## 2025-07-29 PROCEDURE — 99233 SBSQ HOSP IP/OBS HIGH 50: CPT

## 2025-07-29 PROCEDURE — 99418 PROLNG IP/OBS E/M EA 15 MIN: CPT

## 2025-07-29 RX ORDER — NALTREXONE HYDROCHLORIDE 50 MG/1
50 TABLET, FILM COATED ORAL ONCE
Refills: 0 | Status: COMPLETED | OUTPATIENT
Start: 2025-07-29 | End: 2025-07-29

## 2025-07-29 RX ORDER — NALTREXONE HYDROCHLORIDE 50 MG/1
50 TABLET, FILM COATED ORAL DAILY
Refills: 0 | Status: DISCONTINUED | OUTPATIENT
Start: 2025-07-30 | End: 2025-08-08

## 2025-07-29 RX ADMIN — ESCITALOPRAM OXALATE 30 MILLIGRAM(S): 20 TABLET ORAL at 09:49

## 2025-07-29 RX ADMIN — BUPROPION HYDROBROMIDE 300 MILLIGRAM(S): 522 TABLET, EXTENDED RELEASE ORAL at 09:49

## 2025-07-29 RX ADMIN — NALTREXONE HYDROCHLORIDE 50 MILLIGRAM(S): 50 TABLET, FILM COATED ORAL at 10:59

## 2025-07-29 RX ADMIN — Medication 2 MILLIGRAM(S): at 21:05

## 2025-07-29 RX ADMIN — ARIPIPRAZOLE 10 MILLIGRAM(S): 2 TABLET ORAL at 09:49

## 2025-07-30 PROCEDURE — 93010 ELECTROCARDIOGRAM REPORT: CPT

## 2025-07-30 RX ADMIN — HYDROXYZINE HYDROCHLORIDE 50 MILLIGRAM(S): 25 TABLET, FILM COATED ORAL at 21:12

## 2025-07-30 RX ADMIN — ESCITALOPRAM OXALATE 30 MILLIGRAM(S): 20 TABLET ORAL at 08:29

## 2025-07-30 RX ADMIN — ARIPIPRAZOLE 10 MILLIGRAM(S): 2 TABLET ORAL at 08:28

## 2025-07-30 RX ADMIN — NALTREXONE HYDROCHLORIDE 50 MILLIGRAM(S): 50 TABLET, FILM COATED ORAL at 08:29

## 2025-07-30 RX ADMIN — BUPROPION HYDROBROMIDE 300 MILLIGRAM(S): 522 TABLET, EXTENDED RELEASE ORAL at 08:29

## 2025-07-31 LAB
ALBUMIN SERPL ELPH-MCNC: 5.1 G/DL — HIGH (ref 3.3–5)
ALP SERPL-CCNC: 68 U/L — SIGNIFICANT CHANGE UP (ref 40–120)
ALT FLD-CCNC: 53 U/L — HIGH (ref 4–41)
ANION GAP SERPL CALC-SCNC: 18 MMOL/L — HIGH (ref 7–14)
AST SERPL-CCNC: 31 U/L — SIGNIFICANT CHANGE UP (ref 4–40)
BASOPHILS # BLD AUTO: 0.05 K/UL — SIGNIFICANT CHANGE UP (ref 0–0.2)
BASOPHILS NFR BLD AUTO: 0.7 % — SIGNIFICANT CHANGE UP (ref 0–2)
BILIRUB SERPL-MCNC: 1.1 MG/DL — SIGNIFICANT CHANGE UP (ref 0.2–1.2)
BUN SERPL-MCNC: 18 MG/DL — SIGNIFICANT CHANGE UP (ref 7–23)
CALCIUM SERPL-MCNC: 10 MG/DL — SIGNIFICANT CHANGE UP (ref 8.4–10.5)
CHLORIDE SERPL-SCNC: 102 MMOL/L — SIGNIFICANT CHANGE UP (ref 98–107)
CO2 SERPL-SCNC: 20 MMOL/L — LOW (ref 22–31)
CREAT SERPL-MCNC: 1.14 MG/DL — SIGNIFICANT CHANGE UP (ref 0.5–1.3)
EGFR: 92 ML/MIN/1.73M2 — SIGNIFICANT CHANGE UP
EGFR: 92 ML/MIN/1.73M2 — SIGNIFICANT CHANGE UP
EOSINOPHIL # BLD AUTO: 0.08 K/UL — SIGNIFICANT CHANGE UP (ref 0–0.5)
EOSINOPHIL NFR BLD AUTO: 1.1 % — SIGNIFICANT CHANGE UP (ref 0–6)
GLUCOSE SERPL-MCNC: 124 MG/DL — HIGH (ref 70–99)
HCT VFR BLD CALC: 50.2 % — HIGH (ref 39–50)
HGB BLD-MCNC: 17.2 G/DL — HIGH (ref 13–17)
IMM GRANULOCYTES # BLD AUTO: 0.03 K/UL — SIGNIFICANT CHANGE UP (ref 0–0.07)
IMM GRANULOCYTES NFR BLD AUTO: 0.4 % — SIGNIFICANT CHANGE UP (ref 0–0.9)
LYMPHOCYTES # BLD AUTO: 2.2 K/UL — SIGNIFICANT CHANGE UP (ref 1–3.3)
LYMPHOCYTES NFR BLD AUTO: 30.7 % — SIGNIFICANT CHANGE UP (ref 13–44)
MCHC RBC-ENTMCNC: 30.4 PG — SIGNIFICANT CHANGE UP (ref 27–34)
MCHC RBC-ENTMCNC: 34.3 G/DL — SIGNIFICANT CHANGE UP (ref 32–36)
MCV RBC AUTO: 88.8 FL — SIGNIFICANT CHANGE UP (ref 80–100)
MONOCYTES # BLD AUTO: 0.72 K/UL — SIGNIFICANT CHANGE UP (ref 0–0.9)
MONOCYTES NFR BLD AUTO: 10 % — SIGNIFICANT CHANGE UP (ref 2–14)
NEUTROPHILS # BLD AUTO: 4.09 K/UL — SIGNIFICANT CHANGE UP (ref 1.8–7.4)
NEUTROPHILS NFR BLD AUTO: 57.1 % — SIGNIFICANT CHANGE UP (ref 43–77)
NRBC # BLD AUTO: 0 K/UL — SIGNIFICANT CHANGE UP (ref 0–0)
NRBC # FLD: 0 K/UL — SIGNIFICANT CHANGE UP (ref 0–0)
NRBC BLD AUTO-RTO: 0 /100 WBCS — SIGNIFICANT CHANGE UP (ref 0–0)
PLATELET # BLD AUTO: 301 K/UL — SIGNIFICANT CHANGE UP (ref 150–400)
PMV BLD: 11.3 FL — SIGNIFICANT CHANGE UP (ref 7–13)
POTASSIUM SERPL-MCNC: 4 MMOL/L — SIGNIFICANT CHANGE UP (ref 3.5–5.3)
POTASSIUM SERPL-SCNC: 4 MMOL/L — SIGNIFICANT CHANGE UP (ref 3.5–5.3)
PROT SERPL-MCNC: 8.4 G/DL — HIGH (ref 6–8.3)
RBC # BLD: 5.65 M/UL — SIGNIFICANT CHANGE UP (ref 4.2–5.8)
RBC # FLD: 13 % — SIGNIFICANT CHANGE UP (ref 10.3–14.5)
SODIUM SERPL-SCNC: 140 MMOL/L — SIGNIFICANT CHANGE UP (ref 135–145)
WBC # BLD: 7.17 K/UL — SIGNIFICANT CHANGE UP (ref 3.8–10.5)
WBC # FLD AUTO: 7.17 K/UL — SIGNIFICANT CHANGE UP (ref 3.8–10.5)

## 2025-07-31 PROCEDURE — 90853 GROUP PSYCHOTHERAPY: CPT

## 2025-07-31 RX ADMIN — Medication 2 MILLIGRAM(S): at 21:07

## 2025-07-31 RX ADMIN — ARIPIPRAZOLE 10 MILLIGRAM(S): 2 TABLET ORAL at 09:03

## 2025-07-31 RX ADMIN — BUPROPION HYDROBROMIDE 300 MILLIGRAM(S): 522 TABLET, EXTENDED RELEASE ORAL at 09:03

## 2025-07-31 RX ADMIN — HYDROXYZINE HYDROCHLORIDE 50 MILLIGRAM(S): 25 TABLET, FILM COATED ORAL at 21:07

## 2025-07-31 RX ADMIN — NALTREXONE HYDROCHLORIDE 50 MILLIGRAM(S): 50 TABLET, FILM COATED ORAL at 09:03

## 2025-07-31 RX ADMIN — ESCITALOPRAM OXALATE 30 MILLIGRAM(S): 20 TABLET ORAL at 09:02

## 2025-08-01 PROCEDURE — 90870 ELECTROCONVULSIVE THERAPY: CPT

## 2025-08-01 RX ORDER — MELATONIN 5 MG
3 TABLET ORAL AT BEDTIME
Refills: 0 | Status: DISCONTINUED | OUTPATIENT
Start: 2025-08-01 | End: 2025-08-08

## 2025-08-01 RX ORDER — ONDANSETRON HCL/PF 4 MG/2 ML
8 VIAL (ML) INJECTION ONCE
Refills: 0 | Status: COMPLETED | OUTPATIENT
Start: 2025-08-01 | End: 2025-08-01

## 2025-08-01 RX ORDER — MIDAZOLAM IN 0.9 % SOD.CHLORID 1 MG/ML
2 PLASTIC BAG, INJECTION (ML) INTRAVENOUS ONCE
Refills: 0 | Status: DISCONTINUED | OUTPATIENT
Start: 2025-08-01 | End: 2025-08-01

## 2025-08-01 RX ORDER — FLUMAZENIL 0.1 MG/ML
0.2 INJECTION INTRAVENOUS ONCE
Refills: 0 | Status: COMPLETED | OUTPATIENT
Start: 2025-08-01 | End: 2025-08-01

## 2025-08-01 RX ADMIN — ESCITALOPRAM OXALATE 30 MILLIGRAM(S): 20 TABLET ORAL at 08:31

## 2025-08-01 RX ADMIN — FLUMAZENIL 0.2 MILLIGRAM(S): 0.1 INJECTION INTRAVENOUS at 11:49

## 2025-08-01 RX ADMIN — HYDROXYZINE HYDROCHLORIDE 50 MILLIGRAM(S): 25 TABLET, FILM COATED ORAL at 15:55

## 2025-08-01 RX ADMIN — NALTREXONE HYDROCHLORIDE 50 MILLIGRAM(S): 50 TABLET, FILM COATED ORAL at 08:31

## 2025-08-01 RX ADMIN — Medication 2 MILLIGRAM(S): at 20:55

## 2025-08-01 RX ADMIN — BUPROPION HYDROBROMIDE 300 MILLIGRAM(S): 522 TABLET, EXTENDED RELEASE ORAL at 08:31

## 2025-08-01 RX ADMIN — Medication 2 MILLIGRAM(S): at 11:55

## 2025-08-01 RX ADMIN — Medication 3 MILLIGRAM(S): at 22:08

## 2025-08-01 RX ADMIN — Medication 8 MILLIGRAM(S): at 17:26

## 2025-08-01 RX ADMIN — ARIPIPRAZOLE 10 MILLIGRAM(S): 2 TABLET ORAL at 08:39

## 2025-08-02 RX ADMIN — Medication 3 MILLIGRAM(S): at 20:32

## 2025-08-02 RX ADMIN — Medication 2 MILLIGRAM(S): at 20:32

## 2025-08-02 RX ADMIN — BUPROPION HYDROBROMIDE 300 MILLIGRAM(S): 522 TABLET, EXTENDED RELEASE ORAL at 09:05

## 2025-08-02 RX ADMIN — NALTREXONE HYDROCHLORIDE 50 MILLIGRAM(S): 50 TABLET, FILM COATED ORAL at 09:05

## 2025-08-02 RX ADMIN — ESCITALOPRAM OXALATE 30 MILLIGRAM(S): 20 TABLET ORAL at 09:04

## 2025-08-02 RX ADMIN — ARIPIPRAZOLE 10 MILLIGRAM(S): 2 TABLET ORAL at 09:05

## 2025-08-03 PROCEDURE — 99232 SBSQ HOSP IP/OBS MODERATE 35: CPT

## 2025-08-03 RX ADMIN — ARIPIPRAZOLE 10 MILLIGRAM(S): 2 TABLET ORAL at 09:00

## 2025-08-03 RX ADMIN — Medication 2 MILLIGRAM(S): at 20:12

## 2025-08-03 RX ADMIN — NALTREXONE HYDROCHLORIDE 50 MILLIGRAM(S): 50 TABLET, FILM COATED ORAL at 09:01

## 2025-08-03 RX ADMIN — Medication 3 MILLIGRAM(S): at 20:13

## 2025-08-03 RX ADMIN — BUPROPION HYDROBROMIDE 300 MILLIGRAM(S): 522 TABLET, EXTENDED RELEASE ORAL at 09:00

## 2025-08-03 RX ADMIN — ESCITALOPRAM OXALATE 30 MILLIGRAM(S): 20 TABLET ORAL at 08:59

## 2025-08-04 PROCEDURE — 90853 GROUP PSYCHOTHERAPY: CPT

## 2025-08-04 PROCEDURE — 99232 SBSQ HOSP IP/OBS MODERATE 35: CPT | Mod: 25

## 2025-08-04 PROCEDURE — 90870 ELECTROCONVULSIVE THERAPY: CPT

## 2025-08-04 RX ORDER — FLUMAZENIL 0.1 MG/ML
0.2 INJECTION INTRAVENOUS ONCE
Refills: 0 | Status: COMPLETED | OUTPATIENT
Start: 2025-08-04 | End: 2025-08-04

## 2025-08-04 RX ORDER — MIDAZOLAM IN 0.9 % SOD.CHLORID 1 MG/ML
2 PLASTIC BAG, INJECTION (ML) INTRAVENOUS ONCE
Refills: 0 | Status: DISCONTINUED | OUTPATIENT
Start: 2025-08-04 | End: 2025-08-04

## 2025-08-04 RX ADMIN — Medication 2 MILLIGRAM(S): at 16:09

## 2025-08-04 RX ADMIN — BUPROPION HYDROBROMIDE 300 MILLIGRAM(S): 522 TABLET, EXTENDED RELEASE ORAL at 08:25

## 2025-08-04 RX ADMIN — FLUMAZENIL 0.2 MILLIGRAM(S): 0.1 INJECTION INTRAVENOUS at 16:04

## 2025-08-04 RX ADMIN — Medication 2 MILLIGRAM(S): at 20:09

## 2025-08-04 RX ADMIN — Medication 3 MILLIGRAM(S): at 20:09

## 2025-08-04 RX ADMIN — ESCITALOPRAM OXALATE 30 MILLIGRAM(S): 20 TABLET ORAL at 08:25

## 2025-08-04 RX ADMIN — NALTREXONE HYDROCHLORIDE 50 MILLIGRAM(S): 50 TABLET, FILM COATED ORAL at 08:25

## 2025-08-04 RX ADMIN — ARIPIPRAZOLE 10 MILLIGRAM(S): 2 TABLET ORAL at 08:25

## 2025-08-05 PROCEDURE — 99232 SBSQ HOSP IP/OBS MODERATE 35: CPT

## 2025-08-05 RX ADMIN — ESCITALOPRAM OXALATE 30 MILLIGRAM(S): 20 TABLET ORAL at 08:34

## 2025-08-05 RX ADMIN — Medication 3 MILLIGRAM(S): at 20:10

## 2025-08-05 RX ADMIN — NALTREXONE HYDROCHLORIDE 50 MILLIGRAM(S): 50 TABLET, FILM COATED ORAL at 08:33

## 2025-08-05 RX ADMIN — ARIPIPRAZOLE 10 MILLIGRAM(S): 2 TABLET ORAL at 08:34

## 2025-08-05 RX ADMIN — HYDROXYZINE HYDROCHLORIDE 50 MILLIGRAM(S): 25 TABLET, FILM COATED ORAL at 20:09

## 2025-08-05 RX ADMIN — BUPROPION HYDROBROMIDE 300 MILLIGRAM(S): 522 TABLET, EXTENDED RELEASE ORAL at 08:34

## 2025-08-06 PROCEDURE — 90870 ELECTROCONVULSIVE THERAPY: CPT

## 2025-08-06 PROCEDURE — 99232 SBSQ HOSP IP/OBS MODERATE 35: CPT

## 2025-08-06 RX ORDER — FLUMAZENIL 0.1 MG/ML
0.2 INJECTION INTRAVENOUS ONCE
Refills: 0 | Status: COMPLETED | OUTPATIENT
Start: 2025-08-06 | End: 2025-08-06

## 2025-08-06 RX ORDER — BUPROPION HYDROBROMIDE 522 MG/1
1 TABLET, EXTENDED RELEASE ORAL
Qty: 30 | Refills: 0
Start: 2025-08-06 | End: 2025-09-04

## 2025-08-06 RX ORDER — MIDAZOLAM IN 0.9 % SOD.CHLORID 1 MG/ML
2 PLASTIC BAG, INJECTION (ML) INTRAVENOUS ONCE
Refills: 0 | Status: DISCONTINUED | OUTPATIENT
Start: 2025-08-06 | End: 2025-08-06

## 2025-08-06 RX ORDER — ESCITALOPRAM OXALATE 20 MG/1
3 TABLET ORAL
Qty: 90 | Refills: 0
Start: 2025-08-06 | End: 2025-09-04

## 2025-08-06 RX ORDER — ARIPIPRAZOLE 2 MG/1
1 TABLET ORAL
Qty: 30 | Refills: 0
Start: 2025-08-06 | End: 2025-09-04

## 2025-08-06 RX ORDER — NALTREXONE HYDROCHLORIDE 50 MG/1
1 TABLET, FILM COATED ORAL
Qty: 30 | Refills: 0
Start: 2025-08-06 | End: 2025-09-04

## 2025-08-06 RX ADMIN — HYDROXYZINE HYDROCHLORIDE 50 MILLIGRAM(S): 25 TABLET, FILM COATED ORAL at 20:31

## 2025-08-06 RX ADMIN — ESCITALOPRAM OXALATE 30 MILLIGRAM(S): 20 TABLET ORAL at 09:04

## 2025-08-06 RX ADMIN — Medication 2 MILLIGRAM(S): at 12:04

## 2025-08-06 RX ADMIN — Medication 3 MILLIGRAM(S): at 20:31

## 2025-08-06 RX ADMIN — ARIPIPRAZOLE 10 MILLIGRAM(S): 2 TABLET ORAL at 09:04

## 2025-08-06 RX ADMIN — NALTREXONE HYDROCHLORIDE 50 MILLIGRAM(S): 50 TABLET, FILM COATED ORAL at 09:04

## 2025-08-06 RX ADMIN — BUPROPION HYDROBROMIDE 300 MILLIGRAM(S): 522 TABLET, EXTENDED RELEASE ORAL at 09:04

## 2025-08-06 RX ADMIN — FLUMAZENIL 0.2 MILLIGRAM(S): 0.1 INJECTION INTRAVENOUS at 12:00

## 2025-08-07 PROCEDURE — 99232 SBSQ HOSP IP/OBS MODERATE 35: CPT

## 2025-08-07 PROCEDURE — 90853 GROUP PSYCHOTHERAPY: CPT

## 2025-08-07 RX ADMIN — ARIPIPRAZOLE 10 MILLIGRAM(S): 2 TABLET ORAL at 08:42

## 2025-08-07 RX ADMIN — HYDROXYZINE HYDROCHLORIDE 50 MILLIGRAM(S): 25 TABLET, FILM COATED ORAL at 20:14

## 2025-08-07 RX ADMIN — ESCITALOPRAM OXALATE 30 MILLIGRAM(S): 20 TABLET ORAL at 08:42

## 2025-08-07 RX ADMIN — NALTREXONE HYDROCHLORIDE 50 MILLIGRAM(S): 50 TABLET, FILM COATED ORAL at 08:41

## 2025-08-07 RX ADMIN — BUPROPION HYDROBROMIDE 300 MILLIGRAM(S): 522 TABLET, EXTENDED RELEASE ORAL at 08:42

## 2025-08-07 RX ADMIN — Medication 3 MILLIGRAM(S): at 20:14

## 2025-08-08 VITALS — TEMPERATURE: 98 F

## 2025-08-08 LAB
ALBUMIN SERPL ELPH-MCNC: 4.9 G/DL — SIGNIFICANT CHANGE UP (ref 3.3–5)
ALP SERPL-CCNC: 68 U/L — SIGNIFICANT CHANGE UP (ref 40–120)
ALT FLD-CCNC: 37 U/L — SIGNIFICANT CHANGE UP (ref 4–41)
ANION GAP SERPL CALC-SCNC: 14 MMOL/L — SIGNIFICANT CHANGE UP (ref 7–14)
AST SERPL-CCNC: 24 U/L — SIGNIFICANT CHANGE UP (ref 4–40)
BASOPHILS # BLD AUTO: 0.05 K/UL — SIGNIFICANT CHANGE UP (ref 0–0.2)
BASOPHILS NFR BLD AUTO: 0.7 % — SIGNIFICANT CHANGE UP (ref 0–2)
BILIRUB SERPL-MCNC: 0.4 MG/DL — SIGNIFICANT CHANGE UP (ref 0.2–1.2)
BUN SERPL-MCNC: 14 MG/DL — SIGNIFICANT CHANGE UP (ref 7–23)
CALCIUM SERPL-MCNC: 9.8 MG/DL — SIGNIFICANT CHANGE UP (ref 8.4–10.5)
CHLORIDE SERPL-SCNC: 103 MMOL/L — SIGNIFICANT CHANGE UP (ref 98–107)
CO2 SERPL-SCNC: 24 MMOL/L — SIGNIFICANT CHANGE UP (ref 22–31)
CREAT SERPL-MCNC: 1.33 MG/DL — HIGH (ref 0.5–1.3)
EGFR: 77 ML/MIN/1.73M2 — SIGNIFICANT CHANGE UP
EGFR: 77 ML/MIN/1.73M2 — SIGNIFICANT CHANGE UP
EOSINOPHIL # BLD AUTO: 0.15 K/UL — SIGNIFICANT CHANGE UP (ref 0–0.5)
EOSINOPHIL NFR BLD AUTO: 2.2 % — SIGNIFICANT CHANGE UP (ref 0–6)
GLUCOSE SERPL-MCNC: 86 MG/DL — SIGNIFICANT CHANGE UP (ref 70–99)
HCT VFR BLD CALC: 50.4 % — HIGH (ref 39–50)
HGB BLD-MCNC: 17.2 G/DL — HIGH (ref 13–17)
IMM GRANULOCYTES # BLD AUTO: 0.03 K/UL — SIGNIFICANT CHANGE UP (ref 0–0.07)
IMM GRANULOCYTES NFR BLD AUTO: 0.4 % — SIGNIFICANT CHANGE UP (ref 0–0.9)
LYMPHOCYTES # BLD AUTO: 2.69 K/UL — SIGNIFICANT CHANGE UP (ref 1–3.3)
LYMPHOCYTES NFR BLD AUTO: 38.6 % — SIGNIFICANT CHANGE UP (ref 13–44)
MCHC RBC-ENTMCNC: 30.5 PG — SIGNIFICANT CHANGE UP (ref 27–34)
MCHC RBC-ENTMCNC: 34.1 G/DL — SIGNIFICANT CHANGE UP (ref 32–36)
MCV RBC AUTO: 89.4 FL — SIGNIFICANT CHANGE UP (ref 80–100)
MONOCYTES # BLD AUTO: 0.54 K/UL — SIGNIFICANT CHANGE UP (ref 0–0.9)
MONOCYTES NFR BLD AUTO: 7.7 % — SIGNIFICANT CHANGE UP (ref 2–14)
NEUTROPHILS # BLD AUTO: 3.51 K/UL — SIGNIFICANT CHANGE UP (ref 1.8–7.4)
NEUTROPHILS NFR BLD AUTO: 50.4 % — SIGNIFICANT CHANGE UP (ref 43–77)
NRBC # BLD AUTO: 0 K/UL — SIGNIFICANT CHANGE UP (ref 0–0)
NRBC # FLD: 0 K/UL — SIGNIFICANT CHANGE UP (ref 0–0)
NRBC BLD AUTO-RTO: 0 /100 WBCS — SIGNIFICANT CHANGE UP (ref 0–0)
PLATELET # BLD AUTO: 304 K/UL — SIGNIFICANT CHANGE UP (ref 150–400)
PMV BLD: 11.1 FL — SIGNIFICANT CHANGE UP (ref 7–13)
POTASSIUM SERPL-MCNC: 4.1 MMOL/L — SIGNIFICANT CHANGE UP (ref 3.5–5.3)
POTASSIUM SERPL-SCNC: 4.1 MMOL/L — SIGNIFICANT CHANGE UP (ref 3.5–5.3)
PROT SERPL-MCNC: 8.1 G/DL — SIGNIFICANT CHANGE UP (ref 6–8.3)
RBC # BLD: 5.64 M/UL — SIGNIFICANT CHANGE UP (ref 4.2–5.8)
RBC # FLD: 13 % — SIGNIFICANT CHANGE UP (ref 10.3–14.5)
SODIUM SERPL-SCNC: 141 MMOL/L — SIGNIFICANT CHANGE UP (ref 135–145)
WBC # BLD: 6.97 K/UL — SIGNIFICANT CHANGE UP (ref 3.8–10.5)
WBC # FLD AUTO: 6.97 K/UL — SIGNIFICANT CHANGE UP (ref 3.8–10.5)

## 2025-08-08 PROCEDURE — 90870 ELECTROCONVULSIVE THERAPY: CPT

## 2025-08-08 RX ORDER — MIDAZOLAM IN 0.9 % SOD.CHLORID 1 MG/ML
2 PLASTIC BAG, INJECTION (ML) INTRAVENOUS ONCE
Refills: 0 | Status: DISCONTINUED | OUTPATIENT
Start: 2025-08-08 | End: 2025-08-08

## 2025-08-08 RX ADMIN — Medication 2 MILLIGRAM(S): at 11:29
